# Patient Record
Sex: MALE | Race: BLACK OR AFRICAN AMERICAN | NOT HISPANIC OR LATINO | URBAN - METROPOLITAN AREA
[De-identification: names, ages, dates, MRNs, and addresses within clinical notes are randomized per-mention and may not be internally consistent; named-entity substitution may affect disease eponyms.]

---

## 2019-05-24 ENCOUNTER — INPATIENT (INPATIENT)
Facility: HOSPITAL | Age: 19
LOS: 11 days | Discharge: HOME | End: 2019-06-05
Attending: PSYCHIATRY & NEUROLOGY | Admitting: PSYCHIATRY & NEUROLOGY
Payer: COMMERCIAL

## 2019-05-24 VITALS
HEART RATE: 100 BPM | TEMPERATURE: 99 F | RESPIRATION RATE: 18 BRPM | DIASTOLIC BLOOD PRESSURE: 98 MMHG | SYSTOLIC BLOOD PRESSURE: 150 MMHG | OXYGEN SATURATION: 100 %

## 2019-05-24 DIAGNOSIS — F29 UNSPECIFIED PSYCHOSIS NOT DUE TO A SUBSTANCE OR KNOWN PHYSIOLOGICAL CONDITION: ICD-10-CM

## 2019-05-24 DIAGNOSIS — F20.9 SCHIZOPHRENIA, UNSPECIFIED: ICD-10-CM

## 2019-05-24 DIAGNOSIS — R69 ILLNESS, UNSPECIFIED: ICD-10-CM

## 2019-05-24 LAB
ALBUMIN SERPL ELPH-MCNC: 5.1 G/DL — HIGH (ref 3.3–5)
ALP SERPL-CCNC: 59 U/L — SIGNIFICANT CHANGE UP (ref 40–120)
ALT FLD-CCNC: 38 U/L — SIGNIFICANT CHANGE UP (ref 4–41)
AMPHET UR-MCNC: NEGATIVE — SIGNIFICANT CHANGE UP
ANION GAP SERPL CALC-SCNC: 17 MMO/L — HIGH (ref 7–14)
APAP SERPL-MCNC: < 15 UG/ML — LOW (ref 15–25)
APPEARANCE UR: CLEAR — SIGNIFICANT CHANGE UP
AST SERPL-CCNC: 74 U/L — HIGH (ref 4–40)
BACTERIA # UR AUTO: NEGATIVE — SIGNIFICANT CHANGE UP
BARBITURATES UR SCN-MCNC: NEGATIVE — SIGNIFICANT CHANGE UP
BASOPHILS # BLD AUTO: 0.04 K/UL — SIGNIFICANT CHANGE UP (ref 0–0.2)
BASOPHILS NFR BLD AUTO: 0.6 % — SIGNIFICANT CHANGE UP (ref 0–2)
BENZODIAZ UR-MCNC: NEGATIVE — SIGNIFICANT CHANGE UP
BILIRUB SERPL-MCNC: 1 MG/DL — SIGNIFICANT CHANGE UP (ref 0.2–1.2)
BILIRUB UR-MCNC: NEGATIVE — SIGNIFICANT CHANGE UP
BLOOD UR QL VISUAL: NEGATIVE — SIGNIFICANT CHANGE UP
BUN SERPL-MCNC: 15 MG/DL — SIGNIFICANT CHANGE UP (ref 7–23)
CALCIUM SERPL-MCNC: 9.6 MG/DL — SIGNIFICANT CHANGE UP (ref 8.4–10.5)
CANNABINOIDS UR-MCNC: POSITIVE — SIGNIFICANT CHANGE UP
CHLORIDE SERPL-SCNC: 100 MMOL/L — SIGNIFICANT CHANGE UP (ref 98–107)
CO2 SERPL-SCNC: 21 MMOL/L — LOW (ref 22–31)
COCAINE METAB.OTHER UR-MCNC: NEGATIVE — SIGNIFICANT CHANGE UP
COLOR SPEC: SIGNIFICANT CHANGE UP
CREAT SERPL-MCNC: 1.14 MG/DL — SIGNIFICANT CHANGE UP (ref 0.5–1.3)
EOSINOPHIL # BLD AUTO: 0.03 K/UL — SIGNIFICANT CHANGE UP (ref 0–0.5)
EOSINOPHIL NFR BLD AUTO: 0.4 % — SIGNIFICANT CHANGE UP (ref 0–6)
ETHANOL BLD-MCNC: < 10 MG/DL — SIGNIFICANT CHANGE UP
GLUCOSE SERPL-MCNC: 134 MG/DL — HIGH (ref 70–99)
GLUCOSE UR-MCNC: NEGATIVE — SIGNIFICANT CHANGE UP
HCT VFR BLD CALC: 44 % — SIGNIFICANT CHANGE UP (ref 39–50)
HGB BLD-MCNC: 15 G/DL — SIGNIFICANT CHANGE UP (ref 13–17)
HYALINE CASTS # UR AUTO: SIGNIFICANT CHANGE UP
IMM GRANULOCYTES NFR BLD AUTO: 0.1 % — SIGNIFICANT CHANGE UP (ref 0–1.5)
KETONES UR-MCNC: SIGNIFICANT CHANGE UP
LEUKOCYTE ESTERASE UR-ACNC: NEGATIVE — SIGNIFICANT CHANGE UP
LYMPHOCYTES # BLD AUTO: 2.12 K/UL — SIGNIFICANT CHANGE UP (ref 1–3.3)
LYMPHOCYTES # BLD AUTO: 30.8 % — SIGNIFICANT CHANGE UP (ref 13–44)
MCHC RBC-ENTMCNC: 28.5 PG — SIGNIFICANT CHANGE UP (ref 27–34)
MCHC RBC-ENTMCNC: 34.1 % — SIGNIFICANT CHANGE UP (ref 32–36)
MCV RBC AUTO: 83.7 FL — SIGNIFICANT CHANGE UP (ref 80–100)
METHADONE UR-MCNC: NEGATIVE — SIGNIFICANT CHANGE UP
MONOCYTES # BLD AUTO: 0.67 K/UL — SIGNIFICANT CHANGE UP (ref 0–0.9)
MONOCYTES NFR BLD AUTO: 9.7 % — SIGNIFICANT CHANGE UP (ref 2–14)
NEUTROPHILS # BLD AUTO: 4.02 K/UL — SIGNIFICANT CHANGE UP (ref 1.8–7.4)
NEUTROPHILS NFR BLD AUTO: 58.4 % — SIGNIFICANT CHANGE UP (ref 43–77)
NITRITE UR-MCNC: NEGATIVE — SIGNIFICANT CHANGE UP
NRBC # FLD: 0 K/UL — SIGNIFICANT CHANGE UP (ref 0–0)
OPIATES UR-MCNC: NEGATIVE — SIGNIFICANT CHANGE UP
OXYCODONE UR-MCNC: NEGATIVE — SIGNIFICANT CHANGE UP
PCP UR-MCNC: NEGATIVE — SIGNIFICANT CHANGE UP
PH UR: 6 — SIGNIFICANT CHANGE UP (ref 5–8)
PLATELET # BLD AUTO: 257 K/UL — SIGNIFICANT CHANGE UP (ref 150–400)
PMV BLD: 9.1 FL — SIGNIFICANT CHANGE UP (ref 7–13)
POTASSIUM SERPL-MCNC: 3.1 MMOL/L — LOW (ref 3.5–5.3)
POTASSIUM SERPL-SCNC: 3.1 MMOL/L — LOW (ref 3.5–5.3)
PROT SERPL-MCNC: 7.6 G/DL — SIGNIFICANT CHANGE UP (ref 6–8.3)
PROT UR-MCNC: 70 — SIGNIFICANT CHANGE UP
RBC # BLD: 5.26 M/UL — SIGNIFICANT CHANGE UP (ref 4.2–5.8)
RBC # FLD: 11.2 % — SIGNIFICANT CHANGE UP (ref 10.3–14.5)
RBC CASTS # UR COMP ASSIST: SIGNIFICANT CHANGE UP (ref 0–?)
SALICYLATES SERPL-MCNC: < 5 MG/DL — LOW (ref 15–30)
SODIUM SERPL-SCNC: 138 MMOL/L — SIGNIFICANT CHANGE UP (ref 135–145)
SP GR SPEC: > 1.04 — HIGH (ref 1–1.04)
SQUAMOUS # UR AUTO: SIGNIFICANT CHANGE UP
TSH SERPL-MCNC: 1.75 UIU/ML — SIGNIFICANT CHANGE UP (ref 0.27–4.2)
UROBILINOGEN FLD QL: SIGNIFICANT CHANGE UP
WBC # BLD: 6.89 K/UL — SIGNIFICANT CHANGE UP (ref 3.8–10.5)
WBC # FLD AUTO: 6.89 K/UL — SIGNIFICANT CHANGE UP (ref 3.8–10.5)
WBC UR QL: SIGNIFICANT CHANGE UP (ref 0–?)

## 2019-05-24 PROCEDURE — 99285 EMERGENCY DEPT VISIT HI MDM: CPT

## 2019-05-24 RX ORDER — HALOPERIDOL DECANOATE 100 MG/ML
5 INJECTION INTRAMUSCULAR ONCE
Refills: 0 | Status: COMPLETED | OUTPATIENT
Start: 2019-05-24 | End: 2019-05-24

## 2019-05-24 RX ORDER — DIPHENHYDRAMINE HCL 50 MG
50 CAPSULE ORAL ONCE
Refills: 0 | Status: COMPLETED | OUTPATIENT
Start: 2019-05-24 | End: 2019-05-24

## 2019-05-24 RX ORDER — HALOPERIDOL DECANOATE 100 MG/ML
5 INJECTION INTRAMUSCULAR ONCE
Refills: 0 | Status: DISCONTINUED | OUTPATIENT
Start: 2019-05-25 | End: 2019-06-05

## 2019-05-24 RX ORDER — POTASSIUM CHLORIDE 20 MEQ
40 PACKET (EA) ORAL ONCE
Refills: 0 | Status: COMPLETED | OUTPATIENT
Start: 2019-05-24 | End: 2019-05-24

## 2019-05-24 RX ORDER — HALOPERIDOL DECANOATE 100 MG/ML
5 INJECTION INTRAMUSCULAR EVERY 6 HOURS
Refills: 0 | Status: DISCONTINUED | OUTPATIENT
Start: 2019-05-25 | End: 2019-06-05

## 2019-05-24 RX ADMIN — Medication 40 MILLIEQUIVALENT(S): at 17:04

## 2019-05-24 RX ADMIN — Medication 50 MILLIGRAM(S): at 12:10

## 2019-05-24 RX ADMIN — HALOPERIDOL DECANOATE 5 MILLIGRAM(S): 100 INJECTION INTRAMUSCULAR at 20:00

## 2019-05-24 RX ADMIN — HALOPERIDOL DECANOATE 5 MILLIGRAM(S): 100 INJECTION INTRAMUSCULAR at 12:10

## 2019-05-24 RX ADMIN — Medication 2 MILLIGRAM(S): at 20:00

## 2019-05-24 RX ADMIN — Medication 50 MILLIGRAM(S): at 19:59

## 2019-05-24 RX ADMIN — Medication 2 MILLIGRAM(S): at 12:10

## 2019-05-24 NOTE — ED ADULT TRIAGE NOTE - CHIEF COMPLAINT QUOTE
Patient brought in with mother. Patient's mother reports that patient has been having difficulty sleeping and defiant behavior. Patient currently is A&ox4, denies any SI/HI/auditory or visual hallucinations. 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

## 2019-05-24 NOTE — ED ADULT NURSE REASSESSMENT NOTE - DESCRIPTION
1210-Pt very anxious and agitated on presentation, very suspicious, unable to follow command. Pt refusing to change into hospital gowns and give up belongings. Therapeutic communication employed with no effect. Pt became combative when staff attempted to assist pt with changing into gowns and tried to elope from the unit. Pt restrained and medicated for safety and stabilization. 1210-Pt very anxious and agitated on presentation, very suspicious, unable to follow command. Pt refusing to change into hospital gowns and give up belongings. Therapeutic communication employed with no effect. Pt became combative when staff attempted to assist pt with changing into gowns and tried to elope from the unit. Pt restrained and medicated for safety and stabilization. 1:1 observation initiated.

## 2019-05-24 NOTE — ED BEHAVIORAL HEALTH ASSESSMENT NOTE - DIFFERENTIAL
unspecified psychosis  schizoaffective disorder  bipolar I disorder with psychosis  schizophrenia  substance-induced mood/psychotic disorder

## 2019-05-24 NOTE — ED BEHAVIORAL HEALTH ASSESSMENT NOTE - HPI (INCLUDE ILLNESS QUALITY, SEVERITY, DURATION, TIMING, CONTEXT, MODIFYING FACTORS, ASSOCIATED SIGNS AND SYMPTOMS)
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    On arrival to  ED, pt was very agitated and tried to elope from unit. He required Haldol 5 mg, Ativan 2 mg, Benadryl 50 mg IM x 1 for safety of pt and others.   Patient sedated for several hours. He was interviewed when awake and alert. On assessment, pt is superficially cooperative, guarded, and exhibits grossly disorganized thought process. He appears suspicious at times. Pt provides disorganized response when asked why his mother brought him to the ED. He says he was filling out paperwork for his [employer] job and then his mother came home and they cooked together. When asked again, pt says his mother jokingly told him that people are watching him. When asked about events leading up to him receiving IM medications earlier, pt says "I was anxious" but is unable/unwilling to elaborate. He says "I changed out of my clothes too slowly." Pt perseverates on wanting to be discharged so that he can go to school and work. 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 He denies being famous but states he has made a bit of a name for himself. Pt admits to not sleeping recently, stating "I can't get things done if I oversleep." He denies increased energy, racing thoughts. No overt paranoid ideation/delusions elicited. He denies AH/VH. He denies SI/HI. He denies depression. He denies having a psychiatric condition. Pt reports using marijuana and alcohol "once a month." He denies recent use. He denies other substance use.  See  note for collateral obtained from pt's mother.

## 2019-05-24 NOTE — ED BEHAVIORAL HEALTH ASSESSMENT NOTE - SUMMARY
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  Patient presents very agitated, appears suspicious, with disorganized thought process. Pt is unable to care for self at this time and requires inpatient psychiatric admission for safety and stabilization.

## 2019-05-24 NOTE — ED BEHAVIORAL HEALTH ASSESSMENT NOTE - SUICIDE RISK FACTORS
Highly impulsive behavior/Substance abuse/dependence/Agitation/severe anxiety/Global insomnia/Mood episode/Access to means (pills, firearms, etc.)

## 2019-05-24 NOTE — ED BEHAVIORAL HEALTH NOTE - BEHAVIORAL HEALTH NOTE
SW spoke with mother Amairani [phone] to get collateral in family room. Mother stated she brought patient to ER due to multiple behaviors and patient has not slept in a week. Patient has been studying for an  class all week and the test was yesterday.  Patient has been pacing, not able to focus/have conversation, irrational, paranoid and hallucinating. Patient not being able to have conversation and mother will have to repeat multiple times, she believes due to no sleep. For the past few months he has odd sleeping patterns and does not sleep. Patient irrational and paranoid with others taking his things. 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 Patient has hallucinations of flashing lights then periods of darkness that he sees and tells mother. No other safety concerns such as verbal or physical violence. Patient uses marijuana daily and believes today he had K2 mixed in with marijuana. Believes he also consumes alcohol, unknown last use.  Patient is not currently on medication and has not been on anything for 3-4yrs. Mother does not remember medication name or psychiatrist that was prescribing. She stopped medication due to marijuana use and nervous about side effects. No legal concerns. Last treatment for [facility] hospital was 5 years ago at [facility]. Patient has not been in therapy for 3yrs last time with Dr. Carey Knowles [phone] in [place]. Mother made a new appointment to revisit therapy for the 6/1/19 but instead brought him to ER due to increase behavior. No medical problems or access to weapons noted. Mother stated he eats only junk food, has to serve food in front of him to eat and has extremely good hygiene. Mother expressed when in this state he uses two bars of soap, and alcohol wipes all over his body in between showers. Patient is in school at [employer] in [place] for [employer] design and .  He also wants to be a rapper and produces music with friends. He recently got a new job at the [employer] with [employer] supposed to start on Thursday. No hx of violence or SI. Mother is very involved, patient lives with father, mother and two other siblings. Very close with Uncle Lasha who came at the end of conversation. He is sees the same behaviors and patient practices martial arts. SW will continue to monitor. Team made aware.

## 2019-05-24 NOTE — ED PROVIDER NOTE - CLINICAL SUMMARY MEDICAL DECISION MAKING FREE TEXT BOX
19 y/o M  hx  Depression, ADHD  Labs, Urine Tox/UA, EKG.  Medical evaluation performed. There is no clinical evidence of intoxication or any acute medical problem requiring immediate intervention. Patient is awaiting psychiatric consultation. Final disposition will be determined by psychiatrist.

## 2019-05-24 NOTE — ED PROVIDER NOTE - OBJECTIVE STATEMENT
17 y/o M  hx  Depression, ADHD  BIBA   w c/o increase agitation and bizarre behaviour.  Patient appears paranoid.  Denies falling, punching or kicking any objects. Denies pain, SOB, fever, chills, chest/ abdominal discomfort. Denies SI/HI/VH. Denies recent use of alcohol . No evidence  of physical injuries, broken  skin or deformities. Admits to medication non compliance.

## 2019-05-24 NOTE — ED ADULT NURSE REASSESSMENT NOTE - NS ED NURSE REASSESS COMMENT FT1
1227- Restraints discontinued, no signs of injury to pt noted or reported. Pt sedated, but easily arousable. Respirations even and unlabored. Safety and comfort measures maintained. will continue to monitor.

## 2019-05-24 NOTE — ED BEHAVIORAL HEALTH ASSESSMENT NOTE - OTHER
CVM superficially cooperative, guarded a bit irritable paranoia per mother; pt appears suspicious per mother mother [phone]

## 2019-05-24 NOTE — ED BEHAVIORAL HEALTH ASSESSMENT NOTE - DESCRIPTION
see HPI    Vital Signs Last 24 Hrs  T(C): 37 (24 May 2019 12:28), Max: 37 (24 May 2019 12:28)  T(F): 98.6 (24 May 2019 12:28), Max: 98.6 (24 May 2019 12:28)  HR: 100 (24 May 2019 14:09) (100 - 100)  BP: 145/79 (24 May 2019 14:09) (145/79 - 150/98)  BP(mean): --  RR: 18 (24 May 2019 14:09) (18 - 18)  SpO2: 100% (24 May 2019 14:09) (100% - 100%) none see HPI

## 2019-05-25 RX ORDER — RISPERIDONE 4 MG/1
1 TABLET ORAL AT BEDTIME
Refills: 0 | Status: DISCONTINUED | OUTPATIENT
Start: 2019-05-25 | End: 2019-05-31

## 2019-05-25 RX ADMIN — RISPERIDONE 1 MILLIGRAM(S): 4 TABLET ORAL at 22:01

## 2019-05-26 PROCEDURE — 99232 SBSQ HOSP IP/OBS MODERATE 35: CPT

## 2019-05-26 RX ADMIN — RISPERIDONE 1 MILLIGRAM(S): 4 TABLET ORAL at 21:29

## 2019-05-27 PROCEDURE — 99232 SBSQ HOSP IP/OBS MODERATE 35: CPT

## 2019-05-28 RX ORDER — LANOLIN ALCOHOL/MO/W.PET/CERES
3 CREAM (GRAM) TOPICAL ONCE
Refills: 0 | Status: COMPLETED | OUTPATIENT
Start: 2019-05-28 | End: 2019-05-28

## 2019-05-28 RX ADMIN — Medication 3 MILLIGRAM(S): at 02:00

## 2019-05-29 RX ADMIN — RISPERIDONE 1 MILLIGRAM(S): 4 TABLET ORAL at 20:56

## 2019-05-30 RX ADMIN — RISPERIDONE 1 MILLIGRAM(S): 4 TABLET ORAL at 21:48

## 2019-05-31 PROCEDURE — 90853 GROUP PSYCHOTHERAPY: CPT

## 2019-05-31 RX ORDER — RISPERIDONE 4 MG/1
2 TABLET ORAL AT BEDTIME
Refills: 0 | Status: DISCONTINUED | OUTPATIENT
Start: 2019-05-31 | End: 2019-06-05

## 2019-05-31 RX ADMIN — RISPERIDONE 2 MILLIGRAM(S): 4 TABLET ORAL at 22:01

## 2019-06-01 RX ADMIN — RISPERIDONE 2 MILLIGRAM(S): 4 TABLET ORAL at 22:03

## 2019-06-02 RX ADMIN — RISPERIDONE 2 MILLIGRAM(S): 4 TABLET ORAL at 22:07

## 2019-06-03 RX ADMIN — RISPERIDONE 2 MILLIGRAM(S): 4 TABLET ORAL at 23:00

## 2019-06-04 RX ORDER — RISPERIDONE 4 MG/1
1 TABLET ORAL
Qty: 30 | Refills: 0
Start: 2019-06-04 | End: 2019-07-03

## 2019-06-04 RX ADMIN — RISPERIDONE 2 MILLIGRAM(S): 4 TABLET ORAL at 21:22

## 2019-06-05 VITALS — HEART RATE: 60 BPM | SYSTOLIC BLOOD PRESSURE: 121 MMHG | TEMPERATURE: 98 F | DIASTOLIC BLOOD PRESSURE: 60 MMHG

## 2019-06-05 LAB
CHOLEST SERPL-MCNC: 125 MG/DL — SIGNIFICANT CHANGE UP (ref 120–199)
HAV IGM SER-ACNC: NONREACTIVE — SIGNIFICANT CHANGE UP
HBA1C BLD-MCNC: 4.6 % — SIGNIFICANT CHANGE UP (ref 4–5.6)
HBV CORE IGM SER-ACNC: NONREACTIVE — SIGNIFICANT CHANGE UP
HBV SURFACE AG SER-ACNC: NONREACTIVE — SIGNIFICANT CHANGE UP
HCV AB S/CO SERPL IA: 0.05 S/CO — SIGNIFICANT CHANGE UP (ref 0–0.99)
HCV AB SERPL-IMP: SIGNIFICANT CHANGE UP
HDLC SERPL-MCNC: 42 MG/DL — SIGNIFICANT CHANGE UP (ref 35–55)
HIV 1+2 AB+HIV1 P24 AG SERPL QL IA: SIGNIFICANT CHANGE UP
LIPID PNL WITH DIRECT LDL SERPL: 80 MG/DL — SIGNIFICANT CHANGE UP
TRIGL SERPL-MCNC: 76 MG/DL — SIGNIFICANT CHANGE UP (ref 10–149)

## 2019-06-05 PROCEDURE — 93010 ELECTROCARDIOGRAM REPORT: CPT

## 2019-06-06 ENCOUNTER — OUTPATIENT (OUTPATIENT)
Dept: OUTPATIENT SERVICES | Facility: HOSPITAL | Age: 19
LOS: 1 days | Discharge: HOME | End: 2019-06-06

## 2019-06-06 LAB
C TRACH RRNA SPEC QL NAA+PROBE: SIGNIFICANT CHANGE UP
N GONORRHOEA RRNA SPEC QL NAA+PROBE: SIGNIFICANT CHANGE UP
SPECIMEN SOURCE: SIGNIFICANT CHANGE UP
T PALLIDUM AB TITR SER: NEGATIVE — SIGNIFICANT CHANGE UP

## 2019-06-07 DIAGNOSIS — F12.20 CANNABIS DEPENDENCE, UNCOMPLICATED: ICD-10-CM

## 2019-06-07 DIAGNOSIS — F30.2 MANIC EPISODE, SEVERE WITH PSYCHOTIC SYMPTOMS: ICD-10-CM

## 2019-10-19 ENCOUNTER — INPATIENT (INPATIENT)
Facility: HOSPITAL | Age: 19
LOS: 16 days | Discharge: HOME | End: 2019-11-05
Attending: PSYCHIATRY & NEUROLOGY | Admitting: PSYCHIATRY & NEUROLOGY
Payer: COMMERCIAL

## 2019-10-19 VITALS
DIASTOLIC BLOOD PRESSURE: 84 MMHG | RESPIRATION RATE: 18 BRPM | OXYGEN SATURATION: 99 % | SYSTOLIC BLOOD PRESSURE: 156 MMHG | TEMPERATURE: 98 F | HEART RATE: 107 BPM

## 2019-10-19 DIAGNOSIS — F31.2 BIPOLAR DISORDER, CURRENT EPISODE MANIC SEVERE WITH PSYCHOTIC FEATURES: ICD-10-CM

## 2019-10-19 DIAGNOSIS — F12.10 CANNABIS ABUSE, UNCOMPLICATED: ICD-10-CM

## 2019-10-19 DIAGNOSIS — F20.9 SCHIZOPHRENIA, UNSPECIFIED: ICD-10-CM

## 2019-10-19 LAB
ALBUMIN SERPL ELPH-MCNC: 5.3 G/DL — HIGH (ref 3.3–5)
ALP SERPL-CCNC: 63 U/L — SIGNIFICANT CHANGE UP (ref 40–120)
ALT FLD-CCNC: 22 U/L — SIGNIFICANT CHANGE UP (ref 4–41)
ANION GAP SERPL CALC-SCNC: 15 MMO/L — HIGH (ref 7–14)
APAP SERPL-MCNC: < 15 UG/ML — LOW (ref 15–25)
AST SERPL-CCNC: 42 U/L — HIGH (ref 4–40)
BASOPHILS # BLD AUTO: 0.06 K/UL — SIGNIFICANT CHANGE UP (ref 0–0.2)
BASOPHILS NFR BLD AUTO: 0.9 % — SIGNIFICANT CHANGE UP (ref 0–2)
BILIRUB SERPL-MCNC: 0.9 MG/DL — SIGNIFICANT CHANGE UP (ref 0.2–1.2)
BUN SERPL-MCNC: 13 MG/DL — SIGNIFICANT CHANGE UP (ref 7–23)
CALCIUM SERPL-MCNC: 10.2 MG/DL — SIGNIFICANT CHANGE UP (ref 8.4–10.5)
CHLORIDE SERPL-SCNC: 107 MMOL/L — SIGNIFICANT CHANGE UP (ref 98–107)
CO2 SERPL-SCNC: 24 MMOL/L — SIGNIFICANT CHANGE UP (ref 22–31)
CREAT SERPL-MCNC: 0.99 MG/DL — SIGNIFICANT CHANGE UP (ref 0.5–1.3)
EOSINOPHIL # BLD AUTO: 0.17 K/UL — SIGNIFICANT CHANGE UP (ref 0–0.5)
EOSINOPHIL NFR BLD AUTO: 2.7 % — SIGNIFICANT CHANGE UP (ref 0–6)
ETHANOL BLD-MCNC: < 10 MG/DL — SIGNIFICANT CHANGE UP
GLUCOSE SERPL-MCNC: 99 MG/DL — SIGNIFICANT CHANGE UP (ref 70–99)
HCT VFR BLD CALC: 49.5 % — SIGNIFICANT CHANGE UP (ref 39–50)
HGB BLD-MCNC: 15.5 G/DL — SIGNIFICANT CHANGE UP (ref 13–17)
IMM GRANULOCYTES NFR BLD AUTO: 0.3 % — SIGNIFICANT CHANGE UP (ref 0–1.5)
LYMPHOCYTES # BLD AUTO: 2.18 K/UL — SIGNIFICANT CHANGE UP (ref 1–3.3)
LYMPHOCYTES # BLD AUTO: 34.1 % — SIGNIFICANT CHANGE UP (ref 13–44)
MCHC RBC-ENTMCNC: 27.9 PG — SIGNIFICANT CHANGE UP (ref 27–34)
MCHC RBC-ENTMCNC: 31.3 % — LOW (ref 32–36)
MCV RBC AUTO: 89 FL — SIGNIFICANT CHANGE UP (ref 80–100)
MONOCYTES # BLD AUTO: 0.46 K/UL — SIGNIFICANT CHANGE UP (ref 0–0.9)
MONOCYTES NFR BLD AUTO: 7.2 % — SIGNIFICANT CHANGE UP (ref 2–14)
NEUTROPHILS # BLD AUTO: 3.51 K/UL — SIGNIFICANT CHANGE UP (ref 1.8–7.4)
NEUTROPHILS NFR BLD AUTO: 54.8 % — SIGNIFICANT CHANGE UP (ref 43–77)
NRBC # FLD: 0 K/UL — SIGNIFICANT CHANGE UP (ref 0–0)
PLATELET # BLD AUTO: 293 K/UL — SIGNIFICANT CHANGE UP (ref 150–400)
PMV BLD: 9.4 FL — SIGNIFICANT CHANGE UP (ref 7–13)
POTASSIUM SERPL-MCNC: 3.4 MMOL/L — LOW (ref 3.5–5.3)
POTASSIUM SERPL-SCNC: 3.4 MMOL/L — LOW (ref 3.5–5.3)
PROT SERPL-MCNC: 7.8 G/DL — SIGNIFICANT CHANGE UP (ref 6–8.3)
RBC # BLD: 5.56 M/UL — SIGNIFICANT CHANGE UP (ref 4.2–5.8)
RBC # FLD: 11.6 % — SIGNIFICANT CHANGE UP (ref 10.3–14.5)
SALICYLATES SERPL-MCNC: < 5 MG/DL — LOW (ref 15–30)
SODIUM SERPL-SCNC: 146 MMOL/L — HIGH (ref 135–145)
TSH SERPL-MCNC: 1.53 UIU/ML — SIGNIFICANT CHANGE UP (ref 0.27–4.2)
WBC # BLD: 6.4 K/UL — SIGNIFICANT CHANGE UP (ref 3.8–10.5)
WBC # FLD AUTO: 6.4 K/UL — SIGNIFICANT CHANGE UP (ref 3.8–10.5)

## 2019-10-19 PROCEDURE — 99285 EMERGENCY DEPT VISIT HI MDM: CPT

## 2019-10-19 RX ORDER — HALOPERIDOL DECANOATE 100 MG/ML
5 INJECTION INTRAMUSCULAR ONCE
Refills: 0 | Status: COMPLETED | OUTPATIENT
Start: 2019-10-19 | End: 2019-10-19

## 2019-10-19 RX ORDER — DIPHENHYDRAMINE HCL 50 MG
50 CAPSULE ORAL ONCE
Refills: 0 | Status: COMPLETED | OUTPATIENT
Start: 2019-10-19 | End: 2019-10-19

## 2019-10-19 RX ORDER — POTASSIUM CHLORIDE 20 MEQ
40 PACKET (EA) ORAL ONCE
Refills: 0 | Status: COMPLETED | OUTPATIENT
Start: 2019-10-19 | End: 2019-10-19

## 2019-10-19 RX ORDER — HALOPERIDOL DECANOATE 100 MG/ML
5 INJECTION INTRAMUSCULAR ONCE
Refills: 0 | Status: DISCONTINUED | OUTPATIENT
Start: 2019-10-19 | End: 2019-11-05

## 2019-10-19 RX ORDER — RISPERIDONE 4 MG/1
1 TABLET ORAL
Refills: 0 | Status: DISCONTINUED | OUTPATIENT
Start: 2019-10-19 | End: 2019-10-22

## 2019-10-19 RX ORDER — HALOPERIDOL DECANOATE 100 MG/ML
5 INJECTION INTRAMUSCULAR EVERY 6 HOURS
Refills: 0 | Status: DISCONTINUED | OUTPATIENT
Start: 2019-10-19 | End: 2019-11-05

## 2019-10-19 RX ADMIN — HALOPERIDOL DECANOATE 5 MILLIGRAM(S): 100 INJECTION INTRAMUSCULAR at 10:20

## 2019-10-19 RX ADMIN — Medication 2 MILLIGRAM(S): at 10:20

## 2019-10-19 RX ADMIN — Medication 50 MILLIGRAM(S): at 10:20

## 2019-10-19 RX ADMIN — Medication 2 MILLIGRAM(S): at 14:12

## 2019-10-19 RX ADMIN — HALOPERIDOL DECANOATE 5 MILLIGRAM(S): 100 INJECTION INTRAMUSCULAR at 14:12

## 2019-10-19 NOTE — ED BEHAVIORAL HEALTH ASSESSMENT NOTE - HPI (INCLUDE ILLNESS QUALITY, SEVERITY, DURATION, TIMING, CONTEXT, MODIFYING FACTORS, ASSOCIATED SIGNS AND SYMPTOMS)
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    On arrival to  ED, pt was very agitated and tried to elope from unit. He required Haldol 5 mg, Ativan 2 mg, Benadryl 50 mg IM x 1 for safety of pt and others.   Patient sedated for several hours. He was interviewed when awake and alert. On assessment, pt is superficially cooperative, guarded, and exhibits grossly disorganized thought process. He appears suspicious at times. Pt provides disorganized response when asked why his mother brought him to the ED. He says he was filling out paperwork for his [employer] job and then his mother came home and they cooked together. When asked again, pt says his mother jokingly told him that people are watching him. When asked about events leading up to him receiving IM medications earlier, pt says "I was anxious" but is unable/unwilling to elaborate. He says "I changed out of my clothes too slowly." Pt perseverates on wanting to be discharged so that he can go to school and work. 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 He denies being famous but states he has made a bit of a name for himself. Pt admits to not sleeping recently, stating "I can't get things done if I oversleep." He denies increased energy, racing thoughts. No overt paranoid ideation/delusions elicited. He denies AH/VH. He denies SI/HI. He denies depression. He denies having a psychiatric condition. Pt reports using marijuana and alcohol "once a month." He denies recent use. He denies other substance use.  See  note for collateral obtained from pt's mother. 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 Bundy on 10/15/19 was treated with Risperdal, noncompliant with psychiatric treatment with mother recently dispensing meds since 10/15, no history of self-injurious behavior or suicide attempts, no h/o violence or legal issues, no PMH, +regular cannabis use, brought in by family (mother, father, uncle) for not sleeping, paranoia, grandiosity, and disorganized thought process in context of medication noncompliance and likely ongoing cannabis use.    On arrival to  ED, pt was very agitated and was not responding to verbal redirection. He required Haldol 5 mg, Ativan 2 mg, Benadryl 50 mg IM x 1 for safety of pt and others.   Patient was interviewed and observed on both triage and after being medicated.  On assessment, pt is superficially cooperative, guarded, and exhibits grossly disorganized thought process. He appears suspicious at times. Pt provides disorganized response when asked why his mother brought him to the ED.  Patient notably makes statements that he's the "champion" and starts shadow boxing, unable to rationally participate in interview.  Patient is noted to be hyper and seen talking to himself prior to being medicated, acutely labile.  When asked about events leading up to him receiving IM medications earlier, pt says "I was anxious" but is unable/unwilling to elaborate.  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  Patient has not been taking his meds and is visibly paranoid, suspicious on exam with noted persecutory delusions and grandiosity.  Patient had reportedly tried to put his mother's house up for sale for 3 million dollars, despite this being a house owned by mother and not worth that amount.  Patient has not been sleeping and been not taking meds regularly, notably was exhibiting ideas of reference, delusions and missionary ideas.  Patient is acutely agitated on presentation and is exhibiting racing thoughts, flight of ideas not able to linearly participate in interview.  He denies AH/VH but appears to be internally preoccupied.  He denies SI/HI.  He denies depression.  He denies having a psychiatric condition.  Pt is reported to have been using marijuana but unsure of frequency, mother reports known use on 10/15.  Patient is acutely manic, labile with agitation and unable to maintain behavioral control.    Collateral obtained from pt's mother Amairani, father Addison Mohr and uncle, jatinder who report patient has been disruptive at school yesterday, hyper and seen to be talking to himself.  Patient has been irrational and expressed concern for his family's safety but unable to explain why.  Deny SI or HI but express patient has not been sleeping and advocate for admissoin. 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 Bundy on 10/15/19 was treated with Risperdal, noncompliant with psychiatric treatment with mother recently dispensing meds since 10/15, no history of self-injurious behavior or suicide attempts, no h/o violence or legal issues, no PMH, +regular cannabis use, brought in by family (mother, father, uncle) for not sleeping, paranoia, grandiosity, and disorganized thought process in context of medication noncompliance and likely ongoing cannabis use.    On arrival to  ED, pt was very agitated and was not responding to verbal redirection. He required Haldol 5 mg, Ativan 2 mg, Benadryl 50 mg IM x 1 for safety of pt and others.   Patient was interviewed and observed on both triage and after being medicated.  On assessment, pt is superficially cooperative, guarded, and exhibits grossly disorganized thought process. He appears suspicious at times. Pt provides disorganized response when asked why his mother brought him to the ED.  Patient notably makes statements that he's the "champion" and starts shadow boxing, unable to rationally participate in interview.  Patient is noted to be hyper and seen talking to himself prior to being medicated, acutely labile.  When asked about events leading up to him receiving IM medications earlier, pt says "I was anxious" but is unable/unwilling to elaborate.  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  Patient has not been taking his meds and is visibly paranoid, suspicious on exam with noted persecutory delusions and grandiosity.  Patient had reportedly tried to put his mother's house up for sale for 3 million dollars, despite this being a house owned by mother and not worth that amount.  Patient has not been sleeping and been not taking meds regularly, notably was exhibiting ideas of reference, delusions and missionary ideas.  Patient is acutely agitated on presentation and is exhibiting racing thoughts, flight of ideas not able to linearly participate in interview.  He denies AH/VH but appears to be internally preoccupied.  He denies SI/HI.  He denies depression.  He denies having a psychiatric condition.  Pt is reported to have been using marijuana but unsure of frequency, mother reports known use on 10/15.  Patient is acutely manic, labile with agitation and unable to maintain behavioral control.    Collateral obtained from pt's mother Amairani, father Addison Mohr and uncle, jatinder who report patient has been disruptive at school yesterday, hyper and seen to be talking to himself.  Patient has been irrational and expressed concern for his family's safety but unable to explain why.  Deny SI or HI but express patient has not been sleeping and advocate for admissoin.

## 2019-10-19 NOTE — ED BEHAVIORAL HEALTH NOTE - BEHAVIORAL HEALTH NOTE
CORNELIUS called [facility] [phone] to obtain pre authorization. CORNELIUS first spoke with Thelma LEVY who took information. CORNELIUS then spoke with Johanna WADDELL to obtain authorization. Patient was authorized for 3 days 10/19 - 1021/ Review to take place 10/21/2019. Reviewer CYN. Auth # [id number]

## 2019-10-19 NOTE — ED ADULT NURSE NOTE - CHIEF COMPLAINT QUOTE
Arrives with parents for psych evaluation 2/2 mumbling, and not making sense.  Pt noted to have mild paranoia.  As per mother pt compliant with medications, this week.  Received 2 phone calls from school for acting out, talking, mumbling and not listening.  Pt hearing voices.  the voices say "I'm immortal".  Pt noted to have flight of ideas and mumbling in triage.  Pt denies any drug use as per mother. pt's friend endorsed that they smoked K2 last week.

## 2019-10-19 NOTE — ED BEHAVIORAL HEALTH ASSESSMENT NOTE - NAME OF SCHOOL
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

## 2019-10-19 NOTE — ED BEHAVIORAL HEALTH ASSESSMENT NOTE - SUICIDE PROTECTIVE FACTORS
Supportive social network of family or friends/Has future plans/Engaged in work or school/Identifies reasons for living

## 2019-10-19 NOTE — ED ADULT NURSE REASSESSMENT NOTE - NS ED NURSE REASSESS COMMENT FT1
Pt lying on bed eyes close breathing even & unlabored evaluated and cleared by Psych / ER attending for admission report giving to [facility] RN, EMS activated enroute safety & comfort measures maintained eval on going.

## 2019-10-19 NOTE — ED BEHAVIORAL HEALTH ASSESSMENT NOTE - OTHER
[id number] CVM superficially cooperative, guarded mother a bit irritable paranoia per mother; pt appears suspicious per mother seen talking to self, appears internally preoccupied guarded

## 2019-10-19 NOTE — ED PROVIDER NOTE - CLINICAL SUMMARY MEDICAL DECISION MAKING FREE TEXT BOX
19 yo male with psychiatric decompensation. will obtain medical clearance labs, EKG, give IM medications for agitation to enable appropriate workup and consult psych.

## 2019-10-19 NOTE — ED BEHAVIORAL HEALTH ASSESSMENT NOTE - INVOLUNTARY INTRAMUSCULAR MEDICATION DETAILS
see HPI see HPI/ ED course above - Haldol 5mg/ Ativan 2mg / Benadryl 50mg at 10:08am and then Haldol 5mg/ Ativan 2mg at 14:12 for 2 bouts of agitation with benefit.

## 2019-10-19 NOTE — ED BEHAVIORAL HEALTH ASSESSMENT NOTE - SUICIDE RISK FACTORS
Current mood episode/Access to lethal methods (pills, firearm, etc.: Ask specifically about presence or absence of a firearm in the home or ease of accessing/Alcohol/Substance abuse disorders/Agitation/Severe Anxiety/Panic/Insomnia Alcohol/Substance abuse disorders/Access to lethal methods (pills, firearm, etc.: Ask specifically about presence or absence of a firearm in the home or ease of accessing/Mood Disorder current/past/Current mood episode/Agitation/Severe Anxiety/Panic/Insomnia

## 2019-10-19 NOTE — ED BEHAVIORAL HEALTH ASSESSMENT NOTE - PSYCHIATRIC ISSUES AND PLAN (INCLUDE STANDING AND PRN MEDICATION)
Start on Risperdal 1mg BID; use Haldol 5 mg, Ativan 2 mg PO/IM prn agitation Start on Risperdal 1mg BID for treatment of psychosis; use Haldol 5 mg, Ativan 2 mg PO/IM prn agitation

## 2019-10-19 NOTE — ED BEHAVIORAL HEALTH ASSESSMENT NOTE - OTHER PAST PSYCHIATRIC HISTORY (INCLUDE DETAILS REGARDING ONSET, COURSE OF ILLNESS, INPATIENT/OUTPATIENT TREATMENT)
see HPI charted diagnoses of Bipolar Disorder with psychosis, history of cannabis abuse, 2 past psych hospitalization, first in 2014 at [facility] for psychosis and most recently at [facility] from 5/24-6/5/19, attends [facility] and most recently has seen psychiatrist, Dr. Bundy on 10/15/19

## 2019-10-19 NOTE — ED BEHAVIORAL HEALTH ASSESSMENT NOTE - DESCRIPTION
see HPI    Vital Signs Last 24 Hrs  T(C): 36.7 (19 Oct 2019 09:49), Max: 36.7 (19 Oct 2019 09:49)  T(F): 98.1 (19 Oct 2019 09:49), Max: 98.1 (19 Oct 2019 09:49)  HR: 107 (19 Oct 2019 09:49) (107 - 107)  BP: 156/84 (19 Oct 2019 09:49) (156/84 - 156/84)  BP(mean): --  RR: 18 (19 Oct 2019 09:49) (18 - 18)  SpO2: 99% (19 Oct 2019 09:49) (99% - 99%) none see HPI irritable, agitated with emergent IM medications required for safety    Vital Signs Last 24 Hrs  T(C): 36.7 (19 Oct 2019 09:49), Max: 36.7 (19 Oct 2019 09:49)  T(F): 98.1 (19 Oct 2019 09:49), Max: 98.1 (19 Oct 2019 09:49)  HR: 107 (19 Oct 2019 09:49) (107 - 107)  BP: 156/84 (19 Oct 2019 09:49) (156/84 - 156/84)  BP(mean): --  RR: 18 (19 Oct 2019 09:49) (18 - 18)  SpO2: 99% (19 Oct 2019 09:49) (99% - 99%) irritable, agitated with emergent IM medications required for safety and on triage required Haldol 5mg/Ativan 2mg IM at 10:08am followed by Benadryl 50mg IM at 10:20 with benefit.  At time before transfer to [facility] patient reportedly attempted to elope and required IM of Haldol 5mg/ Ativan 2mg at 14:12 with benefit and was transferred safely.    Vital Signs Last 24 Hrs  T(C): 36.7 (19 Oct 2019 09:49), Max: 36.7 (19 Oct 2019 09:49)  T(F): 98.1 (19 Oct 2019 09:49), Max: 98.1 (19 Oct 2019 09:49)  HR: 107 (19 Oct 2019 09:49) (107 - 107)  BP: 156/84 (19 Oct 2019 09:49) (156/84 - 156/84)  BP(mean): --  RR: 18 (19 Oct 2019 09:49) (18 - 18)  SpO2: 99% (19 Oct 2019 09:49) (99% - 99%)

## 2019-10-19 NOTE — ED ADULT NURSE NOTE - NSIMPLEMENTINTERV_GEN_ALL_ED
Implemented All Universal Safety Interventions:  [place] to call system. Call bell, personal items and telephone within reach. Instruct patient to call for assistance. Room bathroom lighting operational. Non-slip footwear when patient is off stretcher. Physically safe environment: no spills, clutter or unnecessary equipment. Stretcher in lowest position, wheels locked, appropriate side rails in place.

## 2019-10-19 NOTE — ED ADULT TRIAGE NOTE - CHIEF COMPLAINT QUOTE
Arrives with parents for psych evaluation 2/2 mumbling, and not making sense.  Pt noted to have mild paranoia.  As per mother pt compliant with medications.  Received 2 phone calls from school for acting out, talking, mumbling and not listening.  Pt hearing voices.  the voices say "I'm immortal".  Pt noted to have flight of ideas and mumbling in triage.  Pt denies any drug use as per mother. pt's friend endorsed that they smoked K2 last week. Arrives with parents for psych evaluation 2/2 mumbling, and not making sense.  Pt noted to have mild paranoia.  As per mother pt compliant with medications, this week.  Received 2 phone calls from school for acting out, talking, mumbling and not listening.  Pt hearing voices.  the voices say "I'm immortal".  Pt noted to have flight of ideas and mumbling in triage.  Pt denies any drug use as per mother. pt's friend endorsed that they smoked K2 last week.

## 2019-10-19 NOTE — ED BEHAVIORAL HEALTH ASSESSMENT NOTE - RISK ASSESSMENT
pt is at acutely elevated risk of harm to self Low Acute Suicide Risk pt is at acutely elevated risk of harm to self.  Risk factors include history of bipolar disorder, acute psychosis, insomnia, lability, cannabis abuse and noted history of 2 past psychiatric hospitalizations in context of treatment non-compliance.  Protective factors include engaged in school and work with supportive family in place.

## 2019-10-19 NOTE — ED BEHAVIORAL HEALTH ASSESSMENT NOTE - MEDICAL ISSUES AND PLAN (INCLUDE STANDING AND PRN MEDICATION)
none hypokalemia supplemented by EM provider, medically cleared with no acute medical issues reported

## 2019-10-19 NOTE — ED PROVIDER NOTE - PROGRESS NOTE DETAILS
Ana Rosa: pt calmer, and more cooperative, pt seen by psych and will need admission pending labs. will continue to reassess.

## 2019-10-19 NOTE — ED BEHAVIORAL HEALTH ASSESSMENT NOTE - SUMMARY
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  Patient presents very agitated, appears suspicious, with disorganized thought process. Pt is unable to care for self at this time and requires inpatient psychiatric admission for safety and stabilization. 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  Patient presents very agitated, appears suspicious, with disorganized thought process. Pt is unable to care for self at this time and requires inpatient psychiatric admission for safety and stabilization. 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  Patient presents very agitated, appears suspicious, with disorganized thought process. Pt is unable to care for self at this time and requires inpatient psychiatric admission for safety and stabilization.  Admit to [facility] on a 9.39 legal status.  No need for constant observation in a locked, supervised setting.  Recommend EMS transportation to unit with buckle guard for safety.

## 2019-10-19 NOTE — ED PROVIDER NOTE - OBJECTIVE STATEMENT
17yo male student with a h/o schizophrenia brought in by his mom for medications noncompliance and psychiatric decompensation. Mom endorses that he has been having increasing paranoia and irrational thoughts. Pt agitated and uncooperative with conversation but states that if we "lock him up he will start killing people". 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 Unable to assess SI. He admits to smoking marijuana one week ago but denies other drug or alcohol use. 17yo male student with a h/o schizophrenia brought in by his mom for medications noncompliance and psychiatric decompensation. Mom endorses that he has been having increasing paranoia and irrational thoughts. Pt agitated and uncooperative with conversation but states that if we "lock him up he will start killing people". Mom also reports that he has not been sleeping. 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 Unable to assess SI. He admits to smoking marijuana one week ago but denies other drug or alcohol use.

## 2019-10-20 PROCEDURE — 99222 1ST HOSP IP/OBS MODERATE 55: CPT

## 2019-10-20 RX ORDER — BENZTROPINE MESYLATE 1 MG
1 TABLET ORAL
Refills: 0 | Status: DISCONTINUED | OUTPATIENT
Start: 2019-10-20 | End: 2019-11-05

## 2019-10-20 RX ORDER — BENZTROPINE MESYLATE 1 MG
2 TABLET ORAL ONCE
Refills: 0 | Status: COMPLETED | OUTPATIENT
Start: 2019-10-20 | End: 2019-10-20

## 2019-10-20 RX ADMIN — RISPERIDONE 1 MILLIGRAM(S): 4 TABLET ORAL at 20:38

## 2019-10-20 RX ADMIN — Medication 2 MILLIGRAM(S): at 09:28

## 2019-10-20 RX ADMIN — RISPERIDONE 1 MILLIGRAM(S): 4 TABLET ORAL at 01:08

## 2019-10-20 RX ADMIN — RISPERIDONE 1 MILLIGRAM(S): 4 TABLET ORAL at 08:01

## 2019-10-20 RX ADMIN — Medication 1 MILLIGRAM(S): at 20:38

## 2019-10-21 PROCEDURE — 99231 SBSQ HOSP IP/OBS SF/LOW 25: CPT

## 2019-10-21 RX ADMIN — Medication 1 MILLIGRAM(S): at 21:36

## 2019-10-21 RX ADMIN — Medication 1 MILLIGRAM(S): at 08:56

## 2019-10-21 RX ADMIN — RISPERIDONE 1 MILLIGRAM(S): 4 TABLET ORAL at 21:36

## 2019-10-21 RX ADMIN — RISPERIDONE 1 MILLIGRAM(S): 4 TABLET ORAL at 08:57

## 2019-10-22 PROCEDURE — 99231 SBSQ HOSP IP/OBS SF/LOW 25: CPT

## 2019-10-22 RX ORDER — RISPERIDONE 4 MG/1
2 TABLET ORAL AT BEDTIME
Refills: 0 | Status: DISCONTINUED | OUTPATIENT
Start: 2019-10-23 | End: 2019-11-05

## 2019-10-22 RX ORDER — RISPERIDONE 4 MG/1
1 TABLET ORAL AT BEDTIME
Refills: 0 | Status: COMPLETED | OUTPATIENT
Start: 2019-10-22 | End: 2019-10-22

## 2019-10-22 RX ADMIN — RISPERIDONE 1 MILLIGRAM(S): 4 TABLET ORAL at 09:18

## 2019-10-22 RX ADMIN — Medication 1 MILLIGRAM(S): at 20:36

## 2019-10-22 RX ADMIN — RISPERIDONE 1 MILLIGRAM(S): 4 TABLET ORAL at 20:36

## 2019-10-22 RX ADMIN — Medication 1 MILLIGRAM(S): at 09:18

## 2019-10-23 PROCEDURE — 99231 SBSQ HOSP IP/OBS SF/LOW 25: CPT

## 2019-10-23 RX ADMIN — Medication 1 MILLIGRAM(S): at 09:12

## 2019-10-23 RX ADMIN — RISPERIDONE 2 MILLIGRAM(S): 4 TABLET ORAL at 21:53

## 2019-10-23 RX ADMIN — Medication 1 MILLIGRAM(S): at 21:53

## 2019-10-24 PROCEDURE — 99231 SBSQ HOSP IP/OBS SF/LOW 25: CPT

## 2019-10-24 RX ADMIN — Medication 1 MILLIGRAM(S): at 09:18

## 2019-10-24 RX ADMIN — RISPERIDONE 2 MILLIGRAM(S): 4 TABLET ORAL at 20:53

## 2019-10-24 RX ADMIN — Medication 1 MILLIGRAM(S): at 20:53

## 2019-10-25 PROCEDURE — 99231 SBSQ HOSP IP/OBS SF/LOW 25: CPT

## 2019-10-25 RX ADMIN — Medication 1 MILLIGRAM(S): at 09:37

## 2019-10-25 RX ADMIN — Medication 1 MILLIGRAM(S): at 21:29

## 2019-10-25 RX ADMIN — RISPERIDONE 2 MILLIGRAM(S): 4 TABLET ORAL at 21:29

## 2019-10-26 RX ADMIN — RISPERIDONE 2 MILLIGRAM(S): 4 TABLET ORAL at 21:21

## 2019-10-26 RX ADMIN — Medication 1 MILLIGRAM(S): at 09:00

## 2019-10-26 RX ADMIN — Medication 1 MILLIGRAM(S): at 21:21

## 2019-10-27 RX ADMIN — Medication 1 MILLIGRAM(S): at 09:11

## 2019-10-27 RX ADMIN — HALOPERIDOL DECANOATE 5 MILLIGRAM(S): 100 INJECTION INTRAMUSCULAR at 16:35

## 2019-10-27 RX ADMIN — Medication 2 MILLIGRAM(S): at 16:35

## 2019-10-27 RX ADMIN — Medication 1 MILLIGRAM(S): at 20:48

## 2019-10-27 RX ADMIN — RISPERIDONE 2 MILLIGRAM(S): 4 TABLET ORAL at 20:48

## 2019-10-28 PROCEDURE — 99231 SBSQ HOSP IP/OBS SF/LOW 25: CPT

## 2019-10-28 RX ADMIN — RISPERIDONE 2 MILLIGRAM(S): 4 TABLET ORAL at 20:36

## 2019-10-28 RX ADMIN — Medication 1 MILLIGRAM(S): at 20:35

## 2019-10-28 RX ADMIN — Medication 1 MILLIGRAM(S): at 08:46

## 2019-10-29 PROCEDURE — 99231 SBSQ HOSP IP/OBS SF/LOW 25: CPT

## 2019-10-29 RX ADMIN — Medication 1 MILLIGRAM(S): at 08:39

## 2019-10-29 RX ADMIN — RISPERIDONE 2 MILLIGRAM(S): 4 TABLET ORAL at 21:20

## 2019-10-29 RX ADMIN — Medication 1 MILLIGRAM(S): at 21:20

## 2019-10-30 PROCEDURE — 99231 SBSQ HOSP IP/OBS SF/LOW 25: CPT

## 2019-10-30 RX ORDER — PALIPERIDONE 1.5 MG/1
234 TABLET, EXTENDED RELEASE ORAL ONCE
Refills: 0 | Status: COMPLETED | OUTPATIENT
Start: 2019-10-30 | End: 2019-10-30

## 2019-10-30 RX ORDER — PALIPERIDONE 1.5 MG/1
156 TABLET, EXTENDED RELEASE ORAL ONCE
Refills: 0 | Status: COMPLETED | OUTPATIENT
Start: 2019-11-05 | End: 2019-11-05

## 2019-10-30 RX ADMIN — Medication 1 MILLIGRAM(S): at 08:44

## 2019-10-30 RX ADMIN — Medication 1 MILLIGRAM(S): at 21:59

## 2019-10-30 RX ADMIN — RISPERIDONE 2 MILLIGRAM(S): 4 TABLET ORAL at 21:59

## 2019-10-30 RX ADMIN — PALIPERIDONE 234 MILLIGRAM(S): 1.5 TABLET, EXTENDED RELEASE ORAL at 15:01

## 2019-10-31 PROCEDURE — 99231 SBSQ HOSP IP/OBS SF/LOW 25: CPT

## 2019-10-31 RX ADMIN — RISPERIDONE 2 MILLIGRAM(S): 4 TABLET ORAL at 21:55

## 2019-10-31 RX ADMIN — Medication 1 MILLIGRAM(S): at 21:55

## 2019-10-31 RX ADMIN — Medication 1 MILLIGRAM(S): at 09:08

## 2019-11-01 PROCEDURE — 99231 SBSQ HOSP IP/OBS SF/LOW 25: CPT

## 2019-11-01 RX ADMIN — RISPERIDONE 2 MILLIGRAM(S): 4 TABLET ORAL at 21:43

## 2019-11-01 RX ADMIN — Medication 1 MILLIGRAM(S): at 21:43

## 2019-11-01 RX ADMIN — Medication 1 MILLIGRAM(S): at 09:49

## 2019-11-02 RX ADMIN — Medication 1 MILLIGRAM(S): at 21:17

## 2019-11-02 RX ADMIN — Medication 1 MILLIGRAM(S): at 08:40

## 2019-11-02 RX ADMIN — RISPERIDONE 2 MILLIGRAM(S): 4 TABLET ORAL at 21:17

## 2019-11-03 RX ORDER — FLUTICASONE PROPIONATE 50 MCG
1 SPRAY, SUSPENSION NASAL ONCE
Refills: 0 | Status: COMPLETED | OUTPATIENT
Start: 2019-11-03 | End: 2019-11-03

## 2019-11-03 RX ADMIN — Medication 1 SPRAY(S): at 22:27

## 2019-11-03 RX ADMIN — Medication 1 MILLIGRAM(S): at 09:02

## 2019-11-03 RX ADMIN — Medication 1 MILLIGRAM(S): at 20:31

## 2019-11-03 RX ADMIN — RISPERIDONE 2 MILLIGRAM(S): 4 TABLET ORAL at 20:31

## 2019-11-04 PROCEDURE — 99231 SBSQ HOSP IP/OBS SF/LOW 25: CPT

## 2019-11-04 RX ORDER — BENZTROPINE MESYLATE 1 MG
1 TABLET ORAL
Qty: 28 | Refills: 0
Start: 2019-11-04 | End: 2019-11-17

## 2019-11-04 RX ADMIN — Medication 1 MILLIGRAM(S): at 10:42

## 2019-11-04 RX ADMIN — RISPERIDONE 2 MILLIGRAM(S): 4 TABLET ORAL at 20:21

## 2019-11-04 RX ADMIN — Medication 1 MILLIGRAM(S): at 20:21

## 2019-11-05 VITALS — HEART RATE: 72 BPM | SYSTOLIC BLOOD PRESSURE: 121 MMHG | TEMPERATURE: 98 F | DIASTOLIC BLOOD PRESSURE: 68 MMHG

## 2019-11-05 PROCEDURE — 99239 HOSP IP/OBS DSCHRG MGMT >30: CPT

## 2019-11-05 RX ADMIN — PALIPERIDONE 156 MILLIGRAM(S): 1.5 TABLET, EXTENDED RELEASE ORAL at 12:14

## 2019-11-05 RX ADMIN — Medication 1 MILLIGRAM(S): at 09:09

## 2019-12-03 RX ORDER — PALIPERIDONE 1.5 MG/1
117 TABLET, EXTENDED RELEASE ORAL
Qty: 0 | Refills: 0 | DISCHARGE
Start: 2019-12-03

## 2021-04-01 ENCOUNTER — OUTPATIENT (OUTPATIENT)
Dept: OUTPATIENT SERVICES | Facility: HOSPITAL | Age: 21
LOS: 1 days | End: 2021-04-01
Payer: MEDICAID

## 2021-04-09 ENCOUNTER — INPATIENT (INPATIENT)
Facility: HOSPITAL | Age: 21
LOS: 23 days | Discharge: HOME | End: 2021-05-03
Attending: PSYCHIATRY & NEUROLOGY | Admitting: PSYCHIATRY & NEUROLOGY
Payer: COMMERCIAL

## 2021-04-09 VITALS
HEIGHT: 72 IN | SYSTOLIC BLOOD PRESSURE: 164 MMHG | OXYGEN SATURATION: 100 % | HEART RATE: 105 BPM | TEMPERATURE: 98 F | DIASTOLIC BLOOD PRESSURE: 100 MMHG | RESPIRATION RATE: 16 BRPM

## 2021-04-09 DIAGNOSIS — F31.9 BIPOLAR DISORDER, UNSPECIFIED: ICD-10-CM

## 2021-04-09 LAB
ALBUMIN SERPL ELPH-MCNC: 5.3 G/DL — HIGH (ref 3.3–5)
ALP SERPL-CCNC: 74 U/L — SIGNIFICANT CHANGE UP (ref 40–120)
ALT FLD-CCNC: 21 U/L — SIGNIFICANT CHANGE UP (ref 4–41)
ANION GAP SERPL CALC-SCNC: 16 MMOL/L — HIGH (ref 7–14)
AST SERPL-CCNC: 33 U/L — SIGNIFICANT CHANGE UP (ref 4–40)
BASOPHILS # BLD AUTO: 0.05 K/UL — SIGNIFICANT CHANGE UP (ref 0–0.2)
BASOPHILS NFR BLD AUTO: 0.7 % — SIGNIFICANT CHANGE UP (ref 0–2)
BILIRUB SERPL-MCNC: 1 MG/DL — SIGNIFICANT CHANGE UP (ref 0.2–1.2)
BUN SERPL-MCNC: 14 MG/DL — SIGNIFICANT CHANGE UP (ref 7–23)
CALCIUM SERPL-MCNC: 10.1 MG/DL — SIGNIFICANT CHANGE UP (ref 8.4–10.5)
CHLORIDE SERPL-SCNC: 102 MMOL/L — SIGNIFICANT CHANGE UP (ref 98–107)
CO2 SERPL-SCNC: 22 MMOL/L — SIGNIFICANT CHANGE UP (ref 22–31)
CREAT SERPL-MCNC: 0.95 MG/DL — SIGNIFICANT CHANGE UP (ref 0.5–1.3)
EOSINOPHIL # BLD AUTO: 0.02 K/UL — SIGNIFICANT CHANGE UP (ref 0–0.5)
EOSINOPHIL NFR BLD AUTO: 0.3 % — SIGNIFICANT CHANGE UP (ref 0–6)
GLUCOSE SERPL-MCNC: 104 MG/DL — HIGH (ref 70–99)
HCT VFR BLD CALC: 44.9 % — SIGNIFICANT CHANGE UP (ref 39–50)
HGB BLD-MCNC: 14.6 G/DL — SIGNIFICANT CHANGE UP (ref 13–17)
IANC: 4.82 K/UL — SIGNIFICANT CHANGE UP (ref 1.5–8.5)
IMM GRANULOCYTES NFR BLD AUTO: 0.1 % — SIGNIFICANT CHANGE UP (ref 0–1.5)
LYMPHOCYTES # BLD AUTO: 2.15 K/UL — SIGNIFICANT CHANGE UP (ref 1–3.3)
LYMPHOCYTES # BLD AUTO: 28.4 % — SIGNIFICANT CHANGE UP (ref 13–44)
MCHC RBC-ENTMCNC: 28.2 PG — SIGNIFICANT CHANGE UP (ref 27–34)
MCHC RBC-ENTMCNC: 32.5 GM/DL — SIGNIFICANT CHANGE UP (ref 32–36)
MCV RBC AUTO: 86.8 FL — SIGNIFICANT CHANGE UP (ref 80–100)
MONOCYTES # BLD AUTO: 0.53 K/UL — SIGNIFICANT CHANGE UP (ref 0–0.9)
MONOCYTES NFR BLD AUTO: 7 % — SIGNIFICANT CHANGE UP (ref 2–14)
NEUTROPHILS # BLD AUTO: 4.82 K/UL — SIGNIFICANT CHANGE UP (ref 1.8–7.4)
NEUTROPHILS NFR BLD AUTO: 63.5 % — SIGNIFICANT CHANGE UP (ref 43–77)
NRBC # BLD: 0 /100 WBCS — SIGNIFICANT CHANGE UP
NRBC # FLD: 0 K/UL — SIGNIFICANT CHANGE UP
PLATELET # BLD AUTO: 329 K/UL — SIGNIFICANT CHANGE UP (ref 150–400)
POTASSIUM SERPL-MCNC: 3.7 MMOL/L — SIGNIFICANT CHANGE UP (ref 3.5–5.3)
POTASSIUM SERPL-SCNC: 3.7 MMOL/L — SIGNIFICANT CHANGE UP (ref 3.5–5.3)
PROT SERPL-MCNC: 8.5 G/DL — HIGH (ref 6–8.3)
RBC # BLD: 5.17 M/UL — SIGNIFICANT CHANGE UP (ref 4.2–5.8)
RBC # FLD: 11.7 % — SIGNIFICANT CHANGE UP (ref 10.3–14.5)
SARS-COV-2 RNA SPEC QL NAA+PROBE: SIGNIFICANT CHANGE UP
SODIUM SERPL-SCNC: 140 MMOL/L — SIGNIFICANT CHANGE UP (ref 135–145)
TOXICOLOGY SCREEN, DRUGS OF ABUSE, SERUM RESULT: SIGNIFICANT CHANGE UP
TSH SERPL-MCNC: 1.1 UIU/ML — SIGNIFICANT CHANGE UP (ref 0.27–4.2)
WBC # BLD: 7.58 K/UL — SIGNIFICANT CHANGE UP (ref 3.8–10.5)
WBC # FLD AUTO: 7.58 K/UL — SIGNIFICANT CHANGE UP (ref 3.8–10.5)

## 2021-04-09 PROCEDURE — 99285 EMERGENCY DEPT VISIT HI MDM: CPT

## 2021-04-09 PROCEDURE — 93010 ELECTROCARDIOGRAM REPORT: CPT

## 2021-04-09 PROCEDURE — 99285 EMERGENCY DEPT VISIT HI MDM: CPT | Mod: 25

## 2021-04-09 RX ORDER — HALOPERIDOL DECANOATE 100 MG/ML
5 INJECTION INTRAMUSCULAR EVERY 6 HOURS
Refills: 0 | Status: DISCONTINUED | OUTPATIENT
Start: 2021-04-09 | End: 2021-05-03

## 2021-04-09 RX ORDER — DIPHENHYDRAMINE HCL 50 MG
50 CAPSULE ORAL ONCE
Refills: 0 | Status: DISCONTINUED | OUTPATIENT
Start: 2021-04-09 | End: 2021-05-03

## 2021-04-09 RX ORDER — DIPHENHYDRAMINE HCL 50 MG
50 CAPSULE ORAL ONCE
Refills: 0 | Status: DISCONTINUED | OUTPATIENT
Start: 2021-04-09 | End: 2021-04-09

## 2021-04-09 RX ORDER — HALOPERIDOL DECANOATE 100 MG/ML
5 INJECTION INTRAMUSCULAR ONCE
Refills: 0 | Status: DISCONTINUED | OUTPATIENT
Start: 2021-04-09 | End: 2021-05-03

## 2021-04-09 RX ORDER — BENZTROPINE MESYLATE 1 MG
1 TABLET ORAL
Refills: 0 | Status: DISCONTINUED | OUTPATIENT
Start: 2021-04-09 | End: 2021-04-27

## 2021-04-09 RX ORDER — RISPERIDONE 4 MG/1
1 TABLET ORAL
Refills: 0 | Status: DISCONTINUED | OUTPATIENT
Start: 2021-04-09 | End: 2021-04-12

## 2021-04-09 RX ORDER — DIPHENHYDRAMINE HCL 50 MG
50 CAPSULE ORAL EVERY 6 HOURS
Refills: 0 | Status: DISCONTINUED | OUTPATIENT
Start: 2021-04-09 | End: 2021-05-03

## 2021-04-09 RX ORDER — HALOPERIDOL DECANOATE 100 MG/ML
5 INJECTION INTRAMUSCULAR ONCE
Refills: 0 | Status: COMPLETED | OUTPATIENT
Start: 2021-04-09 | End: 2021-04-09

## 2021-04-09 RX ORDER — DIPHENHYDRAMINE HCL 50 MG
50 CAPSULE ORAL ONCE
Refills: 0 | Status: DISCONTINUED | OUTPATIENT
Start: 2021-04-09 | End: 2021-04-12

## 2021-04-09 RX ADMIN — Medication 1 MILLIGRAM(S): at 22:14

## 2021-04-09 RX ADMIN — Medication 2 MILLIGRAM(S): at 22:15

## 2021-04-09 RX ADMIN — Medication 50 MILLIGRAM(S): at 22:14

## 2021-04-09 RX ADMIN — Medication 2 MILLIGRAM(S): at 16:05

## 2021-04-09 RX ADMIN — HALOPERIDOL DECANOATE 5 MILLIGRAM(S): 100 INJECTION INTRAMUSCULAR at 22:14

## 2021-04-09 RX ADMIN — RISPERIDONE 1 MILLIGRAM(S): 4 TABLET ORAL at 22:14

## 2021-04-09 RX ADMIN — HALOPERIDOL DECANOATE 5 MILLIGRAM(S): 100 INJECTION INTRAMUSCULAR at 16:05

## 2021-04-09 NOTE — ED BEHAVIORAL HEALTH ASSESSMENT NOTE - INVOLUNTARY INTRAMUSCULAR MEDICATION DETAILS
see HPI/ ED course above - Haldol 5mg/ Ativan 2mg / Benadryl 50mg at 10:08am and then Haldol 5mg/ Ativan 2mg at 14:12 for 2 bouts of agitation with benefit. Haldol 5mg/ Ativan 2mg / Benadryl 50mg

## 2021-04-09 NOTE — ED ADULT NURSE NOTE - CHIEF COMPLAINT QUOTE
pt sent in by family for auditory hallucinations, acting agitated at home. PMH schizophrenia- as per mother pt was admitted at [facility] last year, has been noncompliant with medication since. Pt calm and cooperative, talking to self in triage. Denies SI/HI. Mother Amairani [phone]

## 2021-04-09 NOTE — BH PATIENT PROFILE - HOME MEDICATIONS
benztropine 1 mg oral tablet , 1 tab(s) orally 2 times a day  Shirley Sustenna 117 mg/0.75 mL intramuscular suspension, extended release , 117 milligram(s) intramuscular once a month

## 2021-04-09 NOTE — ED BEHAVIORAL HEALTH ASSESSMENT NOTE - SUMMARY
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  Patient presents very agitated, appears suspicious, with disorganized thought process. Pt is unable to care for self at this time and requires inpatient psychiatric admission for safety and stabilization.  Admit to [facility] on a 9.39 legal status.  No need for constant observation in a locked, supervised setting.  Recommend EMS transportation to unit with buckle guard for safety. 21 y/o -American male, single, noncaregiver, lives with parents and 2 siblings, charted diagnoses of Bipolar Disorder with psychosis, history of cannabis and K2 abuse, 3 past psych hospitalizations, first in 2014 at [facility] for psychosis and most recently at [facility] from 10/19/19-11/5/19, no current outpatient provider, noncompliant with psychiatric treatment since [facility] discharge, no history of self-injurious behavior or suicide attempts, no h/o violence or legal issues, no PMH, +regular cannabis and K2 use, brought in by cousin for not sleeping, paranoia, grandiosity, and disorganized thought process in context of medication noncompliance and likely ongoing cannabis and k2 use.    Patient presents very agitated, with disorganized thought process. He is acutely manic with pressured speech, decreased need for sleep, racing thoughts and grandiose delusions. Pt is unable to care for self at this time and requires inpatient psychiatric admission for safety and stabilization.  Admit to [facility] on a 9.39 legal status.  No need for constant observation in a locked, supervised setting.  Recommend EMS transportation to unit with buckle guard for safety.
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
## 2021-04-09 NOTE — ED ADULT NURSE NOTE - OBJECTIVE STATEMENT
Pt arrived to  with cousin. As per family, pt has been noncompliant with medications and has had worsening agitation at home. Pt hyperverbal during intake. Pt changed into  clothing. Personal property collected and logged.

## 2021-04-09 NOTE — ED BEHAVIORAL HEALTH ASSESSMENT NOTE - PSYCHIATRIC ISSUES AND PLAN (INCLUDE STANDING AND PRN MEDICATION)
Start on Risperdal 1mg BID for treatment of psychosis; use Haldol 5 mg, Ativan 2 mg PO/IM prn agitation Start on Risperdal 1mg BID for treatment of psychosis; use Haldol 5 mg, Ativan 2 mg and Benadryl 50mg PO/IM prn agitation

## 2021-04-09 NOTE — ED BEHAVIORAL HEALTH ASSESSMENT NOTE - PRIOR MEDICATION SIDE EFFECTS OR ADVERSE REACTIONS
Speech Language Pathology  Dysphagia Treatment Note/Cognitive Evaluation     Name:  Valente Russo  :   1976  Medical Diagnosis:  Alcohol withdrawal seizure, uncomplicated ([facility] [address] [G33.404]  Alcohol withdrawal seizure, uncomplicated ([facility] [address] [H63.301]  Delirium tremens ([facility] [F10.231]  Treatment Diagnosis: Oropharyngeal Dysphagia  Pain level:  Pt did not report pain    Current Diet Level: General and Moderately thick liquids; no drinking straw    Tolerance of Current Diet Level:Per chart review, no documented difficulties with current diet level noted. Assessment of Texture Tolerance:  -Impressions: Pt was positioned upright in bed on RA. Pt with thin water on bedside table. Reported he had been drinking it with no difficulty. Trials of thin liquids, Mildly Thick (Nectar) Liquids and regular solids were provided. Pt again demonstrated frequent throat clearing prior to PO trials. Pt was mildly impulsive with liquids intake. Pt demonstrated adequate mastication and bolus propulsion. Swallow timing and laryngeal elevation appeared functional. With PO intake pt continued to demonstrate intermittent throat clearing. Overall pt appeared to demonstrate gross overall tolerance of Regular solids and thin liquids. Educated pt on safe swallow strategies and aspiration precautions however, carryover was poor. Diet and Treatment Recommendations:  1.) Regular Textures with thin liquids, no straws, meds crushed in puree  2.) Assistance with feeding, ensure Pt takes small drinks  3.) If difficulty is noted, recommend downgrade to NPO with ongoing swallowing assessment     Dysphagia Goals:  1.) Pt will tolerate ongoing assessment for po diet readiness (ongoing 2020)   2.) Pt will tolerated staff assisted tsp presentation of moderately thick (honey) and puree without s/s of penetration/aspiration (ongoing 2020)     Cognitive Evaluation  Pt presented with slightly improved cognition this date.  Pt oriented to None known

## 2021-04-09 NOTE — BH PATIENT PROFILE - NSSBIRTSIXOCC_GEN_A_CORE
[facility]  [address]  [facility] [address]  Phone: [phone]  Fax: [phone] January 20, 2017      Orestes Santana MD  [address]  [facility] [address]    Patient: Avis Graves   MR Number: [id number]   YOB: 1975   Date of Visit: 1/18/2017     Dear Dr. Santana:    Thank you for referring Avis Graves to me for evaluation. Below are the relevant portions of my assessment and plan of care.    As you know, Ms. Graves is a 41-year-old postmenopausal female referred for evaluation of implant following recently diagnosed carcinoma of the right breast. She was initially referred for surgical evaluation of a breast lump on December 28, 2016 during an exam and mammogram.  This showed signs of suspicious focal assymmetry. A stereotactic biopsy was performed on January 5, 2017, with pathology revealing infiltrating ductal carcinoma of the breast. She routinely performs self breast exams. She has noticed a change on breast exam. She denies recent nipple discharge, though she does note nipple discharge that spontaneously resolved over 10 years ago. She admits to a previous lumpectomy when she was 17 years old. She denies a personal history of breast cancer. She is here today to discuss placement of a right breast implant.     The procedure along with the risks, benefits and post operative course were discussed with Ms. Graves, her  and parents. All questions were answered and she is in agreement with the plan. We will proceed with right mastectomy with tissue expander placement and left mastopexy on January 30, 2017. Her genetic testing is pending and surgical plans could change based on those results. Will discuss patient with Dr. Santana     If you have questions, please do not hesitate to call me.     Sincerely,        Bam Nunez MD  Section of Plastic & Reconstructive Surgery  [facility]     CECILE/dr MCKENZIE Sánchez  TU Rivas Jr., MD      Never

## 2021-04-09 NOTE — ED PROVIDER NOTE - CLINICAL SUMMARY MEDICAL DECISION MAKING FREE TEXT BOX
Labs, Urine Tox/UA, EKG    Medical evaluation performed. There is no clinical evidence of intoxication or any acute medical problem requiring immediate intervention. Patient is awaiting psychiatric consultation. Final disposition will be determined by psychiatrist. 21 y/o M hx Schizophrenia, ADHD  Labs, Urine Tox/UA, EKG    Medical evaluation performed. There is no clinical evidence of intoxication or any acute medical problem requiring immediate intervention. Patient is awaiting psychiatric consultation. Final disposition will be determined by psychiatrist.

## 2021-04-09 NOTE — ED BEHAVIORAL HEALTH ASSESSMENT NOTE - MEDICAL ISSUES AND PLAN (INCLUDE STANDING AND PRN MEDICATION)
hypokalemia supplemented by EM provider, medically cleared with no acute medical issues reported NONE

## 2021-04-09 NOTE — ED ADULT TRIAGE NOTE - CHIEF COMPLAINT QUOTE
pt sent in by family for auditory hallucinations, acting agitated at home. Unknown of past psychiatric history- as per mother pt was admitted at [facility] last year, has been noncompliant with medication since. Pt calm and cooperative, talking to self in triage. Denies SI/HI. Mother Amairani [phone] pt sent in by family for auditory hallucinations, acting agitated at home. PMH schizophrenia- as per mother pt was admitted at [facility] last year, has been noncompliant with medication since. Pt calm and cooperative, talking to self in triage. Denies SI/HI. Mother Amairani [phone]

## 2021-04-09 NOTE — ED BEHAVIORAL HEALTH ASSESSMENT NOTE - DESCRIPTION
none see HPI irritable, agitated with emergent IM medications required for safety and on triage required Haldol 5mg/Ativan 2mg IM at 10:08am followed by Benadryl 50mg IM at 10:20 with benefit.  At time before transfer to [facility] patient reportedly attempted to elope and required IM of Haldol 5mg/ Ativan 2mg at 14:12 with benefit and was transferred safely.    Vital Signs Last 24 Hrs  T(C): 36.7 (19 Oct 2019 09:49), Max: 36.7 (19 Oct 2019 09:49)  T(F): 98.1 (19 Oct 2019 09:49), Max: 98.1 (19 Oct 2019 09:49)  HR: 107 (19 Oct 2019 09:49) (107 - 107)  BP: 156/84 (19 Oct 2019 09:49) (156/84 - 156/84)  BP(mean): --  RR: 18 (19 Oct 2019 09:49) (18 - 18)  SpO2: 99% (19 Oct 2019 09:49) (99% - 99%) agitated, restless, moving around triage, making inappropriate gestures.   Vital Signs Last 24 Hrs  T(C): 36.7 (09 Apr 2021 12:38), Max: 36.7 (09 Apr 2021 12:38)  T(F): 98.1 (09 Apr 2021 12:38), Max: 98.1 (09 Apr 2021 12:38)  HR: 105 (09 Apr 2021 12:38) (105 - 105)  BP: 164/100 (09 Apr 2021 12:38) (164/100 - 164/100)  BP(mean): --  RR: 16 (09 Apr 2021 12:38) (16 - 16)  SpO2: 100% (09 Apr 2021 12:38) (100% - 100%) see HPI, not in school

## 2021-04-09 NOTE — ED BEHAVIORAL HEALTH ASSESSMENT NOTE - OTHER PAST PSYCHIATRIC HISTORY (INCLUDE DETAILS REGARDING ONSET, COURSE OF ILLNESS, INPATIENT/OUTPATIENT TREATMENT)
charted diagnoses of Bipolar Disorder with psychosis, history of cannabis abuse, 2 past psych hospitalization, first in 2014 at [facility] for psychosis and most recently at [facility] from 5/24-6/5/19, attends [facility] and most recently has seen psychiatrist, Dr. Bundy on 10/15/19 charted diagnoses of Bipolar Disorder with psychosis, history of cannabis abuse, 2 past psych hospitalization, first in 2014 at [facility] for psychosis and most recently at [facility] from 10/19/19-11/05/2019,   no outpatient treatment.

## 2021-04-09 NOTE — ED BEHAVIORAL HEALTH NOTE - BEHAVIORAL HEALTH NOTE
Writer called Amairani [phone] pt's mother to inform her patient will be transferred to [facility] at [facility].  Pt has not had covid.  Pt's mother and father tested positive two weeks ago.  Pt was tested in a van near their [place] [phone] specimen #[id number]  and tested negative.  Pt has not travelled.  Pt does not have sleep apnea.
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
## 2021-04-09 NOTE — ED BEHAVIORAL HEALTH ASSESSMENT NOTE - HPI (INCLUDE ILLNESS QUALITY, SEVERITY, DURATION, TIMING, CONTEXT, MODIFYING FACTORS, ASSOCIATED SIGNS AND SYMPTOMS)
21 y/o -American male, single, noncaregiver, lives with parents and 2 siblings, charted diagnoses of Bipolar Disorder with psychosis, history of cannabis and K2 abuse, 3 past psych hospitalizations, first in 2014 at [facility] for psychosis and most recently at [facility] from 10/19/19-11/5/19, no current outpatient provider, noncompliant with psychiatric treatment since [facility] discharge, no history of self-injurious behavior or suicide attempts, no h/o violence or legal issues, no PMH, +regular cannabis and K2 use, brought in by cousin for not sleeping, paranoia, grandiosity, and disorganized thought process in context of medication noncompliance and likely ongoing cannabis and k2 use.    On arrival to  ED, pt was very agitated and was not responding to verbal redirection.   Patient was interviewed and observed on both triage.  On assessment, pt is superficially cooperative, guarded, and exhibits grossly disorganized thought process. He appears suspicious at times. Pt provides disorganized response when asked why his mother brought him to the ED.  Patient notably makes statements that he's the "champion" and starts shadow boxing, unable to rationally participate in interview.  Patient is noted to be hyper and seen talking to himself prior to being medicated, acutely labile.  When asked about events leading up to him receiving IM medications earlier, pt says "I was anxious" but is unable/unwilling to elaborate.  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  Patient has not been taking his meds and is visibly paranoid, suspicious on exam with noted persecutory delusions and grandiosity.  Patient had reportedly tried to put his mother's house up for sale for 3 million dollars, despite this being a house owned by mother and not worth that amount.  Patient has not been sleeping and been not taking meds regularly, notably was exhibiting ideas of reference, delusions and missionary ideas.  Patient is acutely agitated on presentation and is exhibiting racing thoughts, flight of ideas not able to linearly participate in interview.  He denies AH/VH but appears to be internally preoccupied.  He denies SI/HI.  He denies depression.  He denies having a psychiatric condition.  Pt is reported to have been using marijuana but unsure of frequency, mother reports known use on 10/15.  Patient is acutely manic, labile with agitation and unable to maintain behavioral control.    Collateral obtained from pt's mother Amairani, father Addison Mohr and uncle, jatinder who report patient has been disruptive at school yesterday, hyper and seen to be talking to himself.  Patient has been irrational and expressed concern for his family's safety but unable to explain why.  Deny SI or HI but express patient has not been sleeping and advocate for admissoin. 19 y/o -American male, single, noncaregiver, lives with parents and 2 siblings, charted diagnoses of Bipolar Disorder with psychosis, history of cannabis and K2 abuse, 3 past psych hospitalizations, first in 2014 at [facility] for psychosis and most recently at [address] from 10/19/19-11/5/19, no current outpatient provider, noncompliant with psychiatric treatment since [facility] discharge, no history of self-injurious behavior or suicide attempts, no h/o violence or legal issues, no PMH, +regular cannabis and K2 use, brought in by cousin for not sleeping, paranoia, grandiosity, and disorganized thought process in context of medication noncompliance and likely ongoing cannabis and k2 use.    On arrival to  ED, pt was very agitated, restless and difficult to interview.    Patient was interviewed and observed in triage.  On assessment, pt has a grossly disorganized thought process. Patient is noted to be hyper and seen talking to himself and continues addressing his cousin and laughing to himself. He was mumbling and it was difficult to understand what he was saying but said that he came to [address] [place] [address] to get help like last time and he may need it again but then started laughing more to himself. He started talking about the clothing he was wearing and said he had to change. Pt was wearing multiple gold chains around his neck. He then proceeded to compliment writer's hair and show his tattoos and ask if she knew what it meant.   Patient has not been sleeping and been not taking meds notably was exhibiting ideas of reference and grandiose delusions. Patient is acutely agitated on presentation and is exhibiting racing thoughts and states he's been having violent thoughts and flight of ideas not able to linearly participate in interview.  He denies AH/VH but appears to be internally preoccupied. He denies SI/HI.  He denies depression. Pt is reported to have been using marijuana but unsure of frequency, and then started laughing saying "it was called grass, green, marijuana" and exhibiting gestures on how to smoke marijuana. Patient is acutely manic, labile with agitation and unable to maintain behavioral control.    While patient was changing he pulled back the curtain and started making sexual comments towards writer and required redirection.       Collateral obtained from pt's mother Amairani in  Note.

## 2021-04-09 NOTE — ED PROVIDER NOTE - DISPOSITION TYPE
PICC dressing lifting around edges of tegaderm.  Dressing changed using sterile technique.  5.5 cm out, in agreement with PICC sheet in chart.  Infant tolerated without complication.   ADMIT

## 2021-04-09 NOTE — ED ADULT NURSE NOTE - NSIMPLEMENTINTERV_GEN_ALL_ED
Implemented All Universal Safety Interventions:  [place] to call system. Call bell, personal items and telephone within reach. Instruct patient to call for assistance. Room bathroom lighting operational. Non-slip footwear when patient is off stretcher. Physically safe environment: no spills, clutter or unnecessary equipment. Stretcher in lowest position, wheels locked, appropriate side rails in place.

## 2021-04-09 NOTE — ED BEHAVIORAL HEALTH NOTE - BEHAVIORAL HEALTH NOTE
COVID Exposure Screen- collateral (i.e. third-party, chart review, belongings, etc; include EMS and ED staff)  1.	*Has the patient had a COVID-19 test in the last 90 days?  (  x) Yes   (  ) No   (  ) Unknown- Reason: _____  IF YES PROCEED TO QUESTION #2. IF NO OR UNKNOWN, PLEASE SKIP TO QUESTION #3.  2.	Date of test(s) and result(s): ___March 29 - NEGATIVE_____  3.	*Has the patient tested positive for COVID-19 antibodies? (  ) Yes   ( x ) No   (  ) Unknown- Reason: _____  IF YES PROCEED TO QUESTION #4. IF NO or UNKNOWN, PLEASE SKIP TO QUESTION #5.  4.	Date of positive antibody test: ________  5.	*Has the patient received 2 doses of the COVID-19 vaccine? (  ) Yes   (  ) No   (  ) Unknown- Reason: _____  IF YES PROCEED TO QUESTION #6. IF NO or UNKNOWN, PLEASE SKIP TO QUESTION #7.  6.	 Date of second dose: ________  7.	*In the past 10 days, has the patient been around anyone with a positive COVID-19 test?* (  x) Yes (parents tested + with a rapid 11 days ago)   (  ) No   (  ) Unknown- Reason: __  IF YES PROCEED TO QUESTION #8. IF NO or UNKNOWN, PLEASE SKIP TO QUESTION #13.  8.	Was the patient within 6 feet of them for at least 15 minutes? (x  ) Yes   (  ) No   (  ) Unknown- Reason: _____  9.	Did the patient provide care for them? (  ) Yes   (  ) No   (  ) Unknown- Reason: ______  10.	Did the patient have direct physical contact with them (touched, hugged, or kissed them)? (  ) Yes   (  ) No    (  ) Unknown- Reason: __  11.	Did the patient share eating or drinking utensils with them? (  ) Yes   (  ) No    (  ) Unknown- Reason: ____  12.	Did they sneeze, cough, or somehow get respiratory droplets on the patient? (  ) Yes   (  ) No    (  ) Unknown- Reason: ______  13.	*Has the patient been out of [place] within the past 10 days?* (  ) Yes   (  x) No   (  ) Unknown- Reason: _____  IF YES PLEASE ANSWER THE FOLLOWING QUESTIONS:  14.	Which state/country did they go to? ______  15.	Were they there over 24 hours? (  ) Yes   (  ) No    (  ) Unknown- Reason: ______  16.	Date of return to [facility]: ______

## 2021-04-09 NOTE — ED ADULT NURSE NOTE - NS_BH TRG QUESTION4_ED_ALL_ED
Doing well, continuing with Rx phenergan, still has some N/V  C/o heartburn- recommended OTC medications  Continuing with PO iron   Reviewed FKCs and PTL s/s   Desires epidural   Considering BTL     Coffective counseling sheet Protect Breastfeeding discussed with mother. Reinforced avoidence of artifical nipples and formula, unless medically indicated.  Encouraged mother to download Coffective mobile esha if she has not already done so. Mother verbalizes understanding.    MILENA SALAS   
No

## 2021-04-09 NOTE — ED BEHAVIORAL HEALTH ASSESSMENT NOTE - OTHER
mother CVM, sunrise [id number] restless, agitated seen talking to self, appears internally preoccupied paranoia per mother; pt appears suspicious

## 2021-04-09 NOTE — ED BEHAVIORAL HEALTH ASSESSMENT NOTE - RISK ASSESSMENT
Low Acute Suicide Risk pt is at acutely elevated risk of harm to self.  Risk factors include history of bipolar disorder, acute psychosis, insomnia, lability, cannabis abuse and noted history of 2 past psychiatric hospitalizations in context of treatment non-compliance.  Protective factors include engaged in school and work with supportive family in place.

## 2021-04-09 NOTE — ED BEHAVIORAL HEALTH ASSESSMENT NOTE - DETAILS
CHICO Guzman no history of suicidality, no history of suicide attempts informed mother Lexi Ibarra NP on L4

## 2021-04-10 DIAGNOSIS — F19.10 OTHER PSYCHOACTIVE SUBSTANCE ABUSE, UNCOMPLICATED: ICD-10-CM

## 2021-04-10 LAB
A1C WITH ESTIMATED AVERAGE GLUCOSE RESULT: 4.8 % — SIGNIFICANT CHANGE UP (ref 4–5.6)
CHOLEST SERPL-MCNC: 160 MG/DL — SIGNIFICANT CHANGE UP
COVID-19 NUCLEOCAPSID GAM AB INTERP: POSITIVE
COVID-19 NUCLEOCAPSID TOTAL GAM ANTIBODY RESULT: 1.66 INDEX — HIGH
ESTIMATED AVERAGE GLUCOSE: 91 MG/DL — SIGNIFICANT CHANGE UP (ref 68–114)
HDLC SERPL-MCNC: 37 MG/DL — LOW
LIPID PNL WITH DIRECT LDL SERPL: 106 MG/DL — HIGH
NON HDL CHOLESTEROL: 123 MG/DL — SIGNIFICANT CHANGE UP
SARS-COV-2 IGG+IGM SERPL QL IA: 1.66 INDEX — HIGH
SARS-COV-2 IGG+IGM SERPL QL IA: POSITIVE
TRIGL SERPL-MCNC: 86 MG/DL — SIGNIFICANT CHANGE UP

## 2021-04-10 PROCEDURE — 99222 1ST HOSP IP/OBS MODERATE 55: CPT

## 2021-04-10 RX ADMIN — Medication 1 MILLIGRAM(S): at 09:09

## 2021-04-10 RX ADMIN — Medication 2 MILLIGRAM(S): at 21:09

## 2021-04-10 RX ADMIN — Medication 50 MILLIGRAM(S): at 21:09

## 2021-04-10 RX ADMIN — HALOPERIDOL DECANOATE 5 MILLIGRAM(S): 100 INJECTION INTRAMUSCULAR at 21:09

## 2021-04-10 RX ADMIN — Medication 1 MILLIGRAM(S): at 21:09

## 2021-04-10 RX ADMIN — RISPERIDONE 1 MILLIGRAM(S): 4 TABLET ORAL at 21:09

## 2021-04-10 RX ADMIN — RISPERIDONE 1 MILLIGRAM(S): 4 TABLET ORAL at 09:10

## 2021-04-10 NOTE — BH INPATIENT PSYCHIATRY ASSESSMENT NOTE - NSCDADMBILLING_PSY_A_CORE
67643 - Initial hospital care - moderate complexity 33115 - Psychiatric diagnostic evaluation with medical services 45723 - Initial hospital care - moderate complexity

## 2021-04-10 NOTE — BH INPATIENT PSYCHIATRY ASSESSMENT NOTE - RISK ASSESSMENT
pt is at acutely elevated risk of harm to self.  Risk factors include history of bipolar disorder, acute psychosis, insomnia, lability, cannabis abuse and noted history of 2 past psychiatric hospitalizations in context of treatment non-compliance.  Protective factors include engaged in school and work with supportive family in place.

## 2021-04-10 NOTE — PSYCHIATRIC REHAB INITIAL EVALUATION - NSBHPRRECOMMEND_PSY_ALL_CORE
Writer met with patient in order to orient patient to unit and briefly introduce patient to psychiatric rehabilitation staff and department function. Patient was provided with a copy of unit schedule. Patient is an unreliable historian as patient is disorganized and unable to tolerate structure of session. Chart reports that patient is currently admitted due to paranoia, poor sleep, and disorganized thought process in the context of medication non-compliance. Writer and patient established a collaborative psychiatric rehabilitation goal. Writer encouraged patient to attend psychiatric rehabilitation groups and engage in treatment. Psychiatric rehabilitation staff will engage patient daily.

## 2021-04-10 NOTE — BH INPATIENT PSYCHIATRY ASSESSMENT NOTE - HPI (INCLUDE ILLNESS QUALITY, SEVERITY, DURATION, TIMING, CONTEXT, MODIFYING FACTORS, ASSOCIATED SIGNS AND SYMPTOMS)
Patient was seen and evaluated, chart reviewed. Case discussed with nursing team.  On service for this 20 yr odl AA male with PPH of Bipolar Disorder.  Patient is hospitalized with a primary problem of psychotic decompensation in context on noncompliance with medications and polysubstance abuse.  Patient admitted to [facility] on  legal status. I have reviewed the initial psychiatric assessment in the electronic medical record, including the history of present illness, past psychiatric history, family/social history (no pertinent changes), and exam, and have confirmed the salient findings dated 21.  As per chart review, transferring records indicated the followin y/o -American male, single, noncaregiver, lives with parents and 2 siblings, charted diagnoses of Bipolar Disorder with psychosis, history of cannabis and K2 abuse, 3 past psych hospitalizations, first in  at [facility] for psychosis and most recently at [address] from 10/19/19-19, no current outpatient provider, noncompliant with psychiatric treatment since [facility] discharge, no history of self-injurious behavior or suicide attempts, no h/o violence or legal issues, no PMH, +regular cannabis and K2 use, brought in by cousin for not sleeping, paranoia, grandiosity, and disorganized thought process in context of medication noncompliance and likely ongoing cannabis and k2 use.  On arrival to  ED, pt was very agitated, restless and difficult to interview.  Patient was interviewed and observed in triage.  On assessment, pt has a grossly disorganized thought process. Patient is noted to be hyper and seen talking to himself and continues addressing his cousin and laughing to himself. He was mumbling and it was difficult to understand what he was saying but said that he came to [address] [place] [address] to get help like last time and he may need it again but then started laughing more to himself. He started talking about the clothing he was wearing and said he had to change. Pt was wearing multiple gold chains around his neck. He then proceeded to compliment writer's hair and show his tattoos and ask if she knew what it meant.   Patient has not been sleeping and been not taking meds notably was exhibiting ideas of reference and grandiose delusions. Patient is acutely agitated on presentation and is exhibiting racing thoughts and states he's been having violent thoughts and flight of ideas not able to linearly participate in interview.  He denies AH/VH but appears to be internally preoccupied. He denies SI/HI.  He denies depression. Pt is reported to have been using marijuana but unsure of frequency, and then started laughing saying "it was called grass, green, marijuana" and exhibiting gestures on how to smoke marijuana. Patient is acutely manic, labile with agitation and unable to maintain behavioral control.  While patient was changing he pulled back the curtain and started making sexual comments towards writer and required redirection.     On unit:  Information Received From: Chart review and patient interview  Patient is followed up for Bipolar Disorder, admitted for psychosis in context of noncompliance with medications.  Chart, medications and labs reviewed.  Patient is discussed with nursing staff. No significant overnight issues. Patient also presents with substance abuse, regular cannabis and K2 use.  Patient unsure of frequency/amount of use,  u-tox +cannabis. Patient is observed pacing hallway, appeared sedated, with unsteady gait.  Patient is escorted back to room with nursing staff and writer.  Patient is notably disorganized, appears paranoid, very guarded, suspicious of writer.   When questioned about his mood and psychotic symptoms, there is some latency. Interview was challenging due to patient’s profound disorganization.  Patient’s story came in fits and fragments, difficult to follow, then would close eyes intermittently, appeared to be asleep. Patient is profoundly disorganized with ongoing perceptual disturbances, exhibiting ideas of reference and grandiose delusions, racing thoughts. He denies SI/HI, denies all psychotic features, despite psychotic symptoms successfully elicited.  At times mumbling to self, talking to self, laughing inappropriately to self during interview.  Unable to fully participate in meaningful interview due to severity of psychotic/disorganized and paranoid symptoms. Patient has not slept in several days prior to admission. Per nursing patient received po prn medications Haldol 5mg Ativan 2mg and Benadryl 50mg, able to sleep last night throughout the night, no appetite concerns.  Remains compliant with medications on unit.  Per chart review patient has potential to be aggressive. Has not had any behavioral issues on unit requiring IM medications.  Continue to provide therapeutic support.

## 2021-04-10 NOTE — BH INPATIENT PSYCHIATRY ASSESSMENT NOTE - NSICDXPASTMEDICALHX_GEN_ALL_CORE_FT
PAST MEDICAL HISTORY:  ADHD (attention deficit hyperactivity disorder)     Schizophrenia, unspecified type

## 2021-04-10 NOTE — BH INPATIENT PSYCHIATRY ASSESSMENT NOTE - CURRENT MEDICATION
MEDICATIONS  (STANDING):  benztropine 1 milliGRAM(s) Oral two times a day  risperiDONE  Disintegrating Tablet 1 milliGRAM(s) Oral two times a day    MEDICATIONS  (PRN):  diphenhydrAMINE 50 milliGRAM(s) Oral every 6 hours PRN eps ppx/agitation  diphenhydrAMINE   Injectable 50 milliGRAM(s) IntraMuscular once PRN eps ppx/severe agitation  diphenhydrAMINE   Injectable 50 milliGRAM(s) IntraMuscular once PRN eps ppx/severe agitation  haloperidol     Tablet 5 milliGRAM(s) Oral every 6 hours PRN agitation  haloperidol    Injectable 5 milliGRAM(s) IntraMuscular once PRN severe agitation  LORazepam     Tablet 2 milliGRAM(s) Oral every 6 hours PRN agitation  LORazepam   Injectable 2 milliGRAM(s) IntraMuscular once PRN severe agitation

## 2021-04-10 NOTE — BH INPATIENT PSYCHIATRY ASSESSMENT NOTE - OTHER PAST PSYCHIATRIC HISTORY (INCLUDE DETAILS REGARDING ONSET, COURSE OF ILLNESS, INPATIENT/OUTPATIENT TREATMENT)
charted diagnoses of Bipolar Disorder with psychosis, history of cannabis abuse, 2 past psych hospitalization, first in 2014 at [facility] for psychosis and most recently at [facility] from 10/19/19-11/05/2019,   no outpatient treatment.

## 2021-04-10 NOTE — BH INPATIENT PSYCHIATRY ASSESSMENT NOTE - NSBHCHARTREVIEWVS_PSY_A_CORE FT
Vital Signs Last 24 Hrs  T(C): 36.3 (10 Apr 2021 06:24), Max: 37.1 (09 Apr 2021 20:59)  T(F): 97.3 (10 Apr 2021 06:24), Max: 98.7 (09 Apr 2021 20:59)  HR: 99 (09 Apr 2021 20:59) (99 - 105)  BP: 149/94 (09 Apr 2021 20:59) (148/89 - 164/100)  BP(mean): --  RR: 16 (09 Apr 2021 20:59) (16 - 16)  SpO2: 100% (09 Apr 2021 20:59) (100% - 100%)

## 2021-04-10 NOTE — BH INPATIENT PSYCHIATRY ASSESSMENT NOTE - CASE SUMMARY
Pt is a 21 y/o AA male, single, noncaregiver, lives with parents and 2 siblings,  diagnoses of Bipolar Disorder with psychosis, history of cannabis and K2 abuse, 3 past psych hospitalizations, first in 2014 at [facility] for psychosis and most recently at [facility] from 10/19/19-11/5/19, no current outpatient provider, noncompliant with psychiatric treatment since [facility] discharge, no history of self-injurious behavior or suicide attempts, no h/o violence or legal issues, no PMH, +regular cannabis and K2 use, brought in by cousin for not sleeping, paranoia, grandiosity, and disorganized thought process in context of medication noncompliance and likely ongoing cannabis and k2 use.  Patient continues to have labile mood, and difficulty concentrating and focusing on tasks. He continues to require inpatient psychiatric hospitalization for his Bipolar disorder.  He is requesting medication to help with his concentration and mood.  He denies any suicidal thoughts and has no thoughts of harming himself or others.

## 2021-04-10 NOTE — BH INPATIENT PSYCHIATRY ASSESSMENT NOTE - NSBHASSESSSUMMFT_PSY_ALL_CORE
Pt is a 21 y/o AA male, single, noncaregiver, lives with parents and 2 siblings,  diagnoses of Bipolar Disorder with psychosis, history of cannabis and K2 abuse, 3 past psych hospitalizations, first in 2014 at [facility] for psychosis and most recently at [facility] from 10/19/19-11/5/19, no current outpatient provider, noncompliant with psychiatric treatment since [facility] discharge, no history of self-injurious behavior or suicide attempts, no h/o violence or legal issues, no PMH, +regular cannabis and K2 use, brought in by cousin for not sleeping, paranoia, grandiosity, and disorganized thought process in context of medication noncompliance and likely ongoing cannabis and k2 use.  Patient continues to have labile mood, and difficulty concentrating and focusing on tasks. He continues to require inpatient psychiatric hospitalization for his Bipolar disorder.

## 2021-04-11 LAB
COVID-19 SPIKE DOMAIN AB INTERP: POSITIVE
COVID-19 SPIKE DOMAIN ANTIBODY RESULT: 6.52 U/ML — HIGH
SARS-COV-2 IGG+IGM SERPL QL IA: 6.52 U/ML — HIGH
SARS-COV-2 IGG+IGM SERPL QL IA: POSITIVE

## 2021-04-11 PROCEDURE — 99232 SBSQ HOSP IP/OBS MODERATE 35: CPT

## 2021-04-11 RX ADMIN — Medication 1 MILLIGRAM(S): at 08:55

## 2021-04-11 RX ADMIN — RISPERIDONE 1 MILLIGRAM(S): 4 TABLET ORAL at 20:47

## 2021-04-11 RX ADMIN — HALOPERIDOL DECANOATE 5 MILLIGRAM(S): 100 INJECTION INTRAMUSCULAR at 20:47

## 2021-04-11 RX ADMIN — Medication 2 MILLIGRAM(S): at 20:47

## 2021-04-11 RX ADMIN — RISPERIDONE 1 MILLIGRAM(S): 4 TABLET ORAL at 08:55

## 2021-04-11 RX ADMIN — Medication 50 MILLIGRAM(S): at 20:48

## 2021-04-11 RX ADMIN — Medication 1 MILLIGRAM(S): at 20:48

## 2021-04-11 RX ADMIN — Medication 50 MILLIGRAM(S): at 10:11

## 2021-04-11 RX ADMIN — Medication 2 MILLIGRAM(S): at 10:12

## 2021-04-11 RX ADMIN — HALOPERIDOL DECANOATE 5 MILLIGRAM(S): 100 INJECTION INTRAMUSCULAR at 10:11

## 2021-04-11 NOTE — BH INPATIENT PSYCHIATRY PROGRESS NOTE - NSBHASSESSSUMMFT_PSY_ALL_CORE
Pt is a 21 y/o AA male, single, noncaregiver, lives with parents and 2 siblings,  diagnoses of Bipolar Disorder with psychosis, history of cannabis and K2 abuse, 3 past psych hospitalizations, first in 2014 at [facility] for psychosis and most recently at [facility] from 10/19/19-11/5/19, no current outpatient provider, noncompliant with psychiatric treatment since [facility] discharge, no history of self-injurious behavior or suicide attempts, no h/o violence or legal issues, no PMH, +regular cannabis and K2 use, brought in by cousin for not sleeping, paranoia, grandiosity, and disorganized thought process in context of medication noncompliance and likely ongoing cannabis and k2 use.  Patient continues to have labile mood, and difficulty concentrating and focusing on tasks. He continues to require inpatient psychiatric hospitalization for his Bipolar disorder.

## 2021-04-11 NOTE — BH INPATIENT PSYCHIATRY PROGRESS NOTE - NSBHFUPINTERVALHXFT_PSY_A_CORE
Patient is followed up for Bipolar Disorder, admitted for psychosis in context of noncompliance with medications.  Chart, medications and labs reviewed.  Patient is discussed with nursing staff. No significant overnight issues. Patient is observed pacing hallway, and in dayroom.  He agrees to interview, he is cooperative but persist manic, hyperverbal disorganized, internally preoccupied, paranoid. Patient reports that prior to admission he got “mad about what I thought I heard, and I needed to protect my family.”  Patient would not elaborate on what he thought he heard only stating “I have a gift of hearing people’s thoughts.” He denies SI/HI, denies all psychotic features, despite psychotic symptoms successfully elicited.  At times mumbling to self, talking to self, laughing inappropriately to self during interview, +grandiose delusions. Improved sleep and appetite.   Remains compliant with medications on unit.  Per chart review patient has potential to be aggressive. Has not had any behavioral issues on unit requiring IM medications.  Continue to provide therapeutic support.

## 2021-04-12 PROCEDURE — 99232 SBSQ HOSP IP/OBS MODERATE 35: CPT

## 2021-04-12 RX ORDER — TRAZODONE HCL 50 MG
50 TABLET ORAL AT BEDTIME
Refills: 0 | Status: DISCONTINUED | OUTPATIENT
Start: 2021-04-12 | End: 2021-05-03

## 2021-04-12 RX ORDER — QUETIAPINE FUMARATE 200 MG/1
50 TABLET, FILM COATED ORAL AT BEDTIME
Refills: 0 | Status: DISCONTINUED | OUTPATIENT
Start: 2021-04-12 | End: 2021-04-12

## 2021-04-12 RX ORDER — DIVALPROEX SODIUM 500 MG/1
500 TABLET, DELAYED RELEASE ORAL
Refills: 0 | Status: DISCONTINUED | OUTPATIENT
Start: 2021-04-12 | End: 2021-04-13

## 2021-04-12 RX ORDER — RISPERIDONE 4 MG/1
2 TABLET ORAL
Refills: 0 | Status: DISCONTINUED | OUTPATIENT
Start: 2021-04-12 | End: 2021-04-13

## 2021-04-12 RX ORDER — HYDROXYZINE HCL 10 MG
25 TABLET ORAL EVERY 6 HOURS
Refills: 0 | Status: DISCONTINUED | OUTPATIENT
Start: 2021-04-12 | End: 2021-05-03

## 2021-04-12 RX ADMIN — RISPERIDONE 1 MILLIGRAM(S): 4 TABLET ORAL at 08:45

## 2021-04-12 RX ADMIN — Medication 2 MILLIGRAM(S): at 21:10

## 2021-04-12 RX ADMIN — DIVALPROEX SODIUM 500 MILLIGRAM(S): 500 TABLET, DELAYED RELEASE ORAL at 21:10

## 2021-04-12 RX ADMIN — Medication 50 MILLIGRAM(S): at 21:10

## 2021-04-12 RX ADMIN — RISPERIDONE 2 MILLIGRAM(S): 4 TABLET ORAL at 21:10

## 2021-04-12 RX ADMIN — Medication 1 MILLIGRAM(S): at 21:10

## 2021-04-12 RX ADMIN — Medication 1 MILLIGRAM(S): at 08:45

## 2021-04-12 NOTE — BH SOCIAL WORK INITIAL PSYCHOSOCIAL EVALUATION - NSBHEMPLOYEDYN_PSY_ALL_CORE
[No Acute Distress] : no acute distress [Well Nourished] : well nourished [Well Developed] : well developed [Normal Sclera/Conjunctiva] : normal sclera/conjunctiva [EOMI] : extraocular movements intact [Normal Outer Ear/Nose] : the outer ears and nose were normal in appearance [Normal Oropharynx] : the oropharynx was normal [No JVD] : no jugular venous distention [No Lymphadenopathy] : no lymphadenopathy [Supple] : supple [No Respiratory Distress] : no respiratory distress  [No Accessory Muscle Use] : no accessory muscle use [Clear to Auscultation] : lungs were clear to auscultation bilaterally [Normal Rate] : normal rate  [Regular Rhythm] : with a regular rhythm [Normal S1, S2] : normal S1 and S2 [No Murmur] : no murmur heard [Pedal Pulses Present] : the pedal pulses are present [No Edema] : there was no peripheral edema [No Extremity Clubbing/Cyanosis] : no extremity clubbing/cyanosis [Soft] : abdomen soft [Non-distended] : non-distended [Normal Bowel Sounds] : normal bowel sounds [Normal Posterior Cervical Nodes] : no posterior cervical lymphadenopathy No [Normal Anterior Cervical Nodes] : no anterior cervical lymphadenopathy [No CVA Tenderness] : no CVA  tenderness [No Spinal Tenderness] : no spinal tenderness [No Joint Swelling] : no joint swelling [Grossly Normal Strength/Tone] : grossly normal strength/tone [No Rash] : no rash [Coordination Grossly Intact] : coordination grossly intact [No Focal Deficits] : no focal deficits [Normal Gait] : normal gait [Normal Affect] : the affect was normal [Normal Insight/Judgement] : insight and judgment were intact

## 2021-04-12 NOTE — BH SOCIAL WORK INITIAL PSYCHOSOCIAL EVALUATION - OTHER PAST PSYCHIATRIC HISTORY (INCLUDE DETAILS REGARDING ONSET, COURSE OF ILLNESS, INPATIENT/OUTPATIENT TREATMENT)
Pt is a 19 y/o -American male, single, non-caregiver, lives with parents and 2 siblings, diagnosed wit Bipolar Disorder with psychosis, hx of cannabis and K2 abuse, 3 past psych hospitalizations, first in 2014 at [facility] for psychosis and most recently at [facility] from 10/19/19-11/5/19, no current outpatient provider, no history of self-injurious behavior or suicide attempts, no h/o violence or legal issues, no PMH, brought in by cousin for not sleeping, paranoia, grandiosity, and disorganized thought process in context of medication noncompliance and likely ongoing cannabis and k2 use.

## 2021-04-12 NOTE — BH SOCIAL WORK INITIAL PSYCHOSOCIAL EVALUATION - NSCMSPTSTRENGTHS_PSY_ALL_CORE
Expressive of emotions/Future/goal oriented/Humor/Intact family/Physically healthy/Positive attitude/Sense of Humor

## 2021-04-12 NOTE — BH INPATIENT PSYCHIATRY PROGRESS NOTE - NSBHFUPINTERVALHXFT_PSY_A_CORE
Patient is followed up for Bipolar Disorder, admitted for psychosis in context of noncompliance with medications.  Chart, medications and labs reviewed.  Patient is discussed with nursing staff. No significant overnight issues but was medicated with PRN medication for agitation last night. He continues to pace the hallways and appears internally preoccupied. The patient admitted to non-command auditory hallucinations. Denies VH, TH. During the follow-up, the patient presented with nando: hyperverbal, grandiosity, easily distracted, tangential, disorganized, paranoid, hypersexual,( "All I want to do is F***", drawing pornographic pictures). He denies SI/ HI, intent and plan. The patient was medication seeking for Adderall. He noted fair sleep and appetite. He is compliant with his medication regimen and denies side effects. Admits to alcohol use, once a week, daily marijuana use (unknown amount). He denies all other substances.

## 2021-04-12 NOTE — BH INPATIENT PSYCHIATRY PROGRESS NOTE - NSBHASSESSSUMMFT_PSY_ALL_CORE
Pt is a 19 y/o AA male, single, noncaregiver, lives with parents and 2 siblings,  diagnoses of Bipolar Disorder with psychosis, history of cannabis and K2 abuse, 3 past psych hospitalizations, first in 2014 at [facility] for psychosis and most recently at [facility] from 10/19/19-11/5/19, no current outpatient provider, noncompliant with psychiatric treatment since [facility] discharge, no history of self-injurious behavior or suicide attempts, no h/o violence or legal issues, no PMH, +regular cannabis and K2 use, brought in by cousin for not sleeping, paranoia, grandiosity, and disorganized thought process in context of medication noncompliance and likely ongoing cannabis and k2 use.  Patient continues to have labile mood, and difficulty concentrating and focusing on tasks. He continues to require inpatient psychiatric hospitalization for his Bipolar disorder.      Presents with nando: hyperverbal, grandiosity, easily distracted, tangential, disorganized, paranoid, hypersexual,( "All I want to do is F***", drawing pornographic pictures). He denies SI/ HI, intent and plan. The patient was medication seeking for Adderall. He noted fair sleep and appetite. He is compliant with his medication regimen and denies side effects. Remains at risk for aggression.     >Obs: Routine, no current SI. no need for CO, patient not expected to pose risk to self or others in controlled inpatient setting  >Psychiatric Meds: Risperdal increased to 2mg po daily, started Depakote ER 500mg po twice a day. Trazadone 50mg po at bedtime PRN.  >Medical:  No acute concerns  >Social: milieu therapy  >Treatment Interventions: Groups and Individual Therapy  >Dispo: Collateral and dispo planning pending further symptom and medication optimization.

## 2021-04-13 PROCEDURE — 99232 SBSQ HOSP IP/OBS MODERATE 35: CPT

## 2021-04-13 PROCEDURE — 90853 GROUP PSYCHOTHERAPY: CPT

## 2021-04-13 RX ORDER — RISPERIDONE 4 MG/1
3 TABLET ORAL AT BEDTIME
Refills: 0 | Status: DISCONTINUED | OUTPATIENT
Start: 2021-04-13 | End: 2021-04-15

## 2021-04-13 RX ORDER — RISPERIDONE 4 MG/1
1 TABLET ORAL DAILY
Refills: 0 | Status: DISCONTINUED | OUTPATIENT
Start: 2021-04-14 | End: 2021-04-16

## 2021-04-13 RX ORDER — DIVALPROEX SODIUM 500 MG/1
500 TABLET, DELAYED RELEASE ORAL
Refills: 0 | Status: DISCONTINUED | OUTPATIENT
Start: 2021-04-13 | End: 2021-05-03

## 2021-04-13 RX ORDER — DIVALPROEX SODIUM 500 MG/1
1000 TABLET, DELAYED RELEASE ORAL AT BEDTIME
Refills: 0 | Status: DISCONTINUED | OUTPATIENT
Start: 2021-04-13 | End: 2021-04-23

## 2021-04-13 RX ADMIN — Medication 1 MILLIGRAM(S): at 21:12

## 2021-04-13 RX ADMIN — RISPERIDONE 2 MILLIGRAM(S): 4 TABLET ORAL at 08:08

## 2021-04-13 RX ADMIN — DIVALPROEX SODIUM 1000 MILLIGRAM(S): 500 TABLET, DELAYED RELEASE ORAL at 21:16

## 2021-04-13 RX ADMIN — Medication 2 MILLIGRAM(S): at 21:14

## 2021-04-13 RX ADMIN — RISPERIDONE 3 MILLIGRAM(S): 4 TABLET ORAL at 21:16

## 2021-04-13 RX ADMIN — Medication 1 MILLIGRAM(S): at 08:08

## 2021-04-13 RX ADMIN — DIVALPROEX SODIUM 500 MILLIGRAM(S): 500 TABLET, DELAYED RELEASE ORAL at 08:08

## 2021-04-13 RX ADMIN — Medication 50 MILLIGRAM(S): at 21:13

## 2021-04-13 NOTE — BH PSYCHOLOGY - GROUP THERAPY NOTE - NSPSYCHOLGRPCOGPT_PSY_A_CORE FT
Patient attended Cognitive Behavioral Therapy Group. The group started with a brief check-in during which Pts were asked to share something they are grateful for this year. The group then focused on the topic of the connection between our physical and mental health and reviewed the importance of getting balanced sleep, eating a balanced diet, and exercising. The group also discussed ways to engage in mindful positive experiences as well as engaging in activities that allow for mastery and build a sense of accomplishment.  explained concepts, reinforced participation, and engaged patients in the discussion.

## 2021-04-13 NOTE — BH PSYCHOLOGY - GROUP THERAPY NOTE - NSBHPSYCHOLRESPCOMMENT_PSY_A_CORE FT
Pt appeared adequately groomed and casually dressed. Pt appeared fairly engaged in group as evidenced by his willingness to attend to the group discussion. During check-in, Pt expressed that he was grateful for his friends telling him about unemployment, particularly because of the pandemic. Pt was quiet for much of group discussion, but listened attentively throughout and agreed with what other members shared. Pt also asked facilitators about how to get a notebook to write in, adding that this would be beneficial for him. Pt agreed to read from the worksheet. Speech was pressured at times, but WNL overall. PT was oriented X3. PT was appropriate with others.

## 2021-04-13 NOTE — BH INPATIENT PSYCHIATRY PROGRESS NOTE - NSBHASSESSSUMMFT_PSY_ALL_CORE
Pt is a 19 y/o AA male, single, noncaregiver, lives with parents and 2 siblings,  diagnoses of Bipolar Disorder with psychosis, history of cannabis and K2 abuse, 3 past psych hospitalizations, first in 2014 at [facility] for psychosis and most recently at [facility] from 10/19/19-11/5/19, no current outpatient provider, noncompliant with psychiatric treatment since [facility] discharge, no history of self-injurious behavior or suicide attempts, no h/o violence or legal issues, no PMH, +regular cannabis and K2 use, brought in by cousin for not sleeping, paranoia, grandiosity, and disorganized thought process in context of medication noncompliance and likely ongoing cannabis and k2 use.  Patient continues to have labile mood, and difficulty concentrating and focusing on tasks. He continues to require inpatient psychiatric hospitalization for his Bipolar disorder.      Improving manic symptoms: hyperverbal, grandiosity, easily distracted, tangential, disorganized, paranoid, hypersexual, hyperactive, drawing pornographic pictures). He denies SI/ HI, intent and plan. Admits to non-command AH which is improving.  Sleep and appetite are good. He is compliant with his medication regimen and denies side effects. Remains at risk for aggression.     >Obs: Routine, no current SI. no need for CO, patient not expected to pose risk to self or others in controlled inpatient setting  >Psychiatric Meds: Risperdal adjusted to 1mg po daily and 3mg po at bedtime, Depakote ER increased to 500mg po daily and 1000mg po at bedtime. Trazadone 50mg po at bedtime PRN.  >Medical:  No acute concerns  >Social: milieu therapy  >Treatment Interventions: Groups and Individual Therapy  >Dispo: Collateral and dispo planning pending further symptom and medication optimization.

## 2021-04-13 NOTE — BH INPATIENT PSYCHIATRY PROGRESS NOTE - NSBHFUPINTERVALHXFT_PSY_A_CORE
Patient is followed up for Bipolar Disorder, admitted for psychosis in context of noncompliance with medications.  Chart, medications and labs reviewed.  Patient was discussed with nursing staff. No significant overnight issues but was medicated with PRN medication for agitation last night. Denies VH, TH. The patient presents with some improvement: Less hyperverbal, less grandiosity, easily distracted, less tangential, less disorganized, some paranoid, less hypersexual. He denies SI/ HI, intent and plan. Admits to non-command  which is improving. Slept well last night and his appetite is good. He is compliant with his medication regimen and denies side effects. Group therapy encouraged.    Attempted to contact patient's mother Amairani at [phone]. No answer and left a voicemail.

## 2021-04-14 PROCEDURE — 99232 SBSQ HOSP IP/OBS MODERATE 35: CPT

## 2021-04-14 RX ADMIN — RISPERIDONE 3 MILLIGRAM(S): 4 TABLET ORAL at 21:22

## 2021-04-14 RX ADMIN — Medication 1 MILLIGRAM(S): at 09:16

## 2021-04-14 RX ADMIN — Medication 1 MILLIGRAM(S): at 21:23

## 2021-04-14 RX ADMIN — DIVALPROEX SODIUM 1000 MILLIGRAM(S): 500 TABLET, DELAYED RELEASE ORAL at 21:23

## 2021-04-14 RX ADMIN — DIVALPROEX SODIUM 500 MILLIGRAM(S): 500 TABLET, DELAYED RELEASE ORAL at 09:16

## 2021-04-14 RX ADMIN — RISPERIDONE 1 MILLIGRAM(S): 4 TABLET ORAL at 09:16

## 2021-04-14 NOTE — BH INPATIENT PSYCHIATRY PROGRESS NOTE - NSBHASSESSSUMMFT_PSY_ALL_CORE
Pt is a 21 y/o AA male, single, noncaregiver, lives with parents and 2 siblings,  diagnoses of Bipolar Disorder with psychosis, history of cannabis and K2 abuse, 3 past psych hospitalizations, first in 2014 at [facility] for psychosis and most recently at [facility] from 10/19/19-11/5/19, no current outpatient provider, noncompliant with psychiatric treatment since [facility] discharge, no history of self-injurious behavior or suicide attempts, no h/o violence or legal issues, no PMH, +regular cannabis and K2 use, brought in by cousin for not sleeping, paranoia, grandiosity, and disorganized thought process in context of medication noncompliance and likely ongoing cannabis and k2 use.  Patient continues to have labile mood, and difficulty concentrating and focusing on tasks. He continues to require inpatient psychiatric hospitalization for his Bipolar disorder.      Improving manic symptoms: hyperverbal, grandiosity, easily distracted, tangential, disorganized, paranoid, hypersexual, hyperactive, drawing pornographic pictures). He denies SI/ HI, intent and plan. Admits to non-command AH which is improving. Notable responding to internal stimuli by talking to self when alone. Sleep and appetite are good. He is compliant with his medication regimen and denies side effects. Remains at risk for aggression.     >Obs: Routine, no current SI. no need for CO, patient not expected to pose risk to self or others in controlled inpatient setting  >Psychiatric Meds: Risperdal adjusted to 1mg po daily and 3mg po at bedtime, Depakote ER increased to 500mg po daily and 1000mg po at bedtime. Trazadone 50mg po at bedtime PRN.  >Lab: Valproate acid level ordered for 4/16.  >Medical:  No acute concerns  >Social: milieu therapy  >Treatment Interventions: Groups and Individual Therapy  >Dispo: Collateral and dispo planning pending further symptom and medication optimization.

## 2021-04-14 NOTE — BH INPATIENT PSYCHIATRY PROGRESS NOTE - NSBHFUPINTERVALHXFT_PSY_A_CORE
Patient is followed up for bipolar Disorder, admitted for psychosis in context of noncompliance with medications.  Chart, medications and labs reviewed.  Patient was discussed with nursing staff. No significant overnight issues. He is visible on the unit and attends group therapy regularly. During the follow-up, the patient was more attentive and engaged. He admits to non-command AH and denies VH, TH, "the voices are being nice to me now", "they saying good things". He is notable responding to internal stimuli by talking to self when alone. The patient presents with some improvement: Less hyperverbal, less grandiosity, easily distracted, less tangential, less disorganized, some paranoid, less hypersexual. He denies SI/ HI, intent and plan. Slept well last night and his appetite is good. He is compliant with his medication regimen and denies side effects. Group therapy encouraged.

## 2021-04-15 PROCEDURE — 99232 SBSQ HOSP IP/OBS MODERATE 35: CPT | Mod: 25

## 2021-04-15 RX ORDER — RISPERIDONE 4 MG/1
4 TABLET ORAL AT BEDTIME
Refills: 0 | Status: DISCONTINUED | OUTPATIENT
Start: 2021-04-15 | End: 2021-05-03

## 2021-04-15 RX ORDER — IBUPROFEN 200 MG
400 TABLET ORAL EVERY 6 HOURS
Refills: 0 | Status: DISCONTINUED | OUTPATIENT
Start: 2021-04-15 | End: 2021-05-03

## 2021-04-15 RX ADMIN — DIVALPROEX SODIUM 500 MILLIGRAM(S): 500 TABLET, DELAYED RELEASE ORAL at 08:58

## 2021-04-15 RX ADMIN — Medication 1 MILLIGRAM(S): at 08:59

## 2021-04-15 RX ADMIN — Medication 50 MILLIGRAM(S): at 20:34

## 2021-04-15 RX ADMIN — RISPERIDONE 4 MILLIGRAM(S): 4 TABLET ORAL at 20:34

## 2021-04-15 RX ADMIN — DIVALPROEX SODIUM 1000 MILLIGRAM(S): 500 TABLET, DELAYED RELEASE ORAL at 20:34

## 2021-04-15 RX ADMIN — Medication 2 MILLIGRAM(S): at 20:34

## 2021-04-15 RX ADMIN — Medication 1 MILLIGRAM(S): at 20:34

## 2021-04-15 RX ADMIN — Medication 400 MILLIGRAM(S): at 13:53

## 2021-04-15 RX ADMIN — RISPERIDONE 1 MILLIGRAM(S): 4 TABLET ORAL at 08:58

## 2021-04-15 NOTE — BH INPATIENT PSYCHIATRY PROGRESS NOTE - NSBHASSESSSUMMFT_PSY_ALL_CORE
Pt is a 19 y/o AA male, single, noncaregiver, lives with parents and 2 siblings,  diagnoses of Bipolar Disorder with psychosis, history of cannabis and K2 abuse, 3 past psych hospitalizations, first in 2014 at [facility] for psychosis and most recently at [facility] from 10/19/19-11/5/19, no current outpatient provider, noncompliant with psychiatric treatment since [facility] discharge, no history of self-injurious behavior or suicide attempts, no h/o violence or legal issues, no PMH, +regular cannabis and K2 use, brought in by cousin for not sleeping, paranoia, grandiosity, and disorganized thought process in context of medication noncompliance and likely ongoing cannabis and k2 use.  Patient continues to have labile mood, and difficulty concentrating and focusing on tasks. He continues to require inpatient psychiatric hospitalization for his Bipolar disorder.      manic symptoms: hyperverbal, grandiosity, easily distracted, tangential, disorganized, paranoid, hypersexual, hyperactive, drawing pornographic pictures). He denies SI/ HI, intent and plan. Admits to non-command AH which is improving. Notable responding to internal stimuli by talking to self when alone. Sleep and appetite are good. He is compliant with his medication regimen and denies side effects. Remains at risk for aggression. C/o hand pain after punching a wall prior to his hospitalization. Motrin 400mg po PRN ordered and cool compress.     >Obs: Routine, no current SI. no need for CO, patient not expected to pose risk to self or others in controlled inpatient setting  >Psychiatric Meds: Risperdal increased to 1mg po daily and 4mg po at bedtime, Depakote ER increased to 500mg po daily and 1000mg po at bedtime. Trazadone 50mg po at bedtime PRN.  >Lab: Valproate acid level ordered for 4/16.  >Medical:  No acute concerns  >Social: milieu therapy  >Treatment Interventions: Groups and Individual Therapy  >Dispo: Collateral and dispo planning pending further symptom and medication optimization.

## 2021-04-15 NOTE — BH INPATIENT PSYCHIATRY PROGRESS NOTE - NSBHFUPINTERVALHXFT_PSY_A_CORE
Patient is followed up for bipolar Disorder, admitted for psychosis in context of noncompliance with medications.  Chart, medications and labs reviewed.  Patient was discussed with nursing staff. No significant overnight issues. He continues to be visible on the unit and attends group therapy regularly. Notably responding to internal stimuli when alone. During the follow-up, the patient was superficially engaged and often tangential. He admits to non-command AH which is becoming less and denies VH, TH.  He denies SI/ HI, intent and plan. Slept well last night and his appetite is good. As per primary RN, the patient stated prior to his admission, he punched a wall and is experiencing hand pain. He is compliant with his medication regimen and denies side effects. Group therapy encouraged.

## 2021-04-16 PROCEDURE — 99232 SBSQ HOSP IP/OBS MODERATE 35: CPT

## 2021-04-16 RX ORDER — ACETAMINOPHEN 500 MG
650 TABLET ORAL ONCE
Refills: 0 | Status: COMPLETED | OUTPATIENT
Start: 2021-04-16 | End: 2021-04-16

## 2021-04-16 RX ORDER — RISPERIDONE 4 MG/1
2 TABLET ORAL DAILY
Refills: 0 | Status: DISCONTINUED | OUTPATIENT
Start: 2021-04-17 | End: 2021-05-03

## 2021-04-16 RX ADMIN — Medication 400 MILLIGRAM(S): at 09:53

## 2021-04-16 RX ADMIN — Medication 650 MILLIGRAM(S): at 02:01

## 2021-04-16 RX ADMIN — RISPERIDONE 1 MILLIGRAM(S): 4 TABLET ORAL at 09:44

## 2021-04-16 RX ADMIN — Medication 50 MILLIGRAM(S): at 21:05

## 2021-04-16 RX ADMIN — DIVALPROEX SODIUM 500 MILLIGRAM(S): 500 TABLET, DELAYED RELEASE ORAL at 09:45

## 2021-04-16 RX ADMIN — RISPERIDONE 4 MILLIGRAM(S): 4 TABLET ORAL at 21:05

## 2021-04-16 RX ADMIN — Medication 1 MILLIGRAM(S): at 21:05

## 2021-04-16 RX ADMIN — Medication 1 MILLIGRAM(S): at 09:45

## 2021-04-16 RX ADMIN — DIVALPROEX SODIUM 1000 MILLIGRAM(S): 500 TABLET, DELAYED RELEASE ORAL at 21:04

## 2021-04-16 RX ADMIN — Medication 650 MILLIGRAM(S): at 00:38

## 2021-04-16 NOTE — BH INPATIENT PSYCHIATRY PROGRESS NOTE - NSBHASSESSSUMMFT_PSY_ALL_CORE
Pt is a 21 y/o AA male, single, noncaregiver, lives with parents and 2 siblings,  diagnoses of Bipolar Disorder with psychosis, history of cannabis and K2 abuse, 3 past psych hospitalizations, first in 2014 at [facility] for psychosis and most recently at [facility] from 10/19/19-11/5/19, no current outpatient provider, noncompliant with psychiatric treatment since [facility] discharge, no history of self-injurious behavior or suicide attempts, no h/o violence or legal issues, no PMH, +regular cannabis and K2 use, brought in by cousin for not sleeping, paranoia, grandiosity, and disorganized thought process in context of medication noncompliance and likely ongoing cannabis and k2 use.  Patient continues to have labile mood, and difficulty concentrating and focusing on tasks. He continues to require inpatient psychiatric hospitalization for his Bipolar disorder.      Manic symptoms: hyperverbal, grandiosity, easily distracted, tangential, disorganized, paranoid, hypersexual, hyperactive, drawing pornographic pictures). Has poor insight into his illness. He denies SI/ HI, intent and plan. Admits to non-command AH which is improving. Notable responding to internal stimuli by talking to self when alone. Sleep and appetite are good. He is compliant with his medication regimen and denies side effects. Remains at risk for aggression.     >Obs: Routine, no current SI. no need for CO, patient not expected to pose risk to self or others in controlled inpatient setting  >Psychiatric Meds: Risperdal increased to 2mg po daily and 4mg po at bedtime, Depakote ER increased to 500mg po daily and 1000mg po at bedtime. Trazadone 50mg po at bedtime PRN.  >Lab: Valproate acid level ordered for 4/16. (Pt refused).  >Medical:  No acute concerns  >Social: milieu therapy  >Treatment Interventions: Groups and Individual Therapy  >Dispo: Collateral and dispo planning pending further symptom and medication optimization.

## 2021-04-16 NOTE — BH INPATIENT PSYCHIATRY PROGRESS NOTE - NSBHFUPINTERVALHXFT_PSY_A_CORE
Patient is followed up for bipolar Disorder, admitted for psychosis in context of noncompliance with medications.  Chart, medications and labs reviewed.  Patient was discussed with nursing staff. No significant overnight issues. He continues to be visible on the unit, cooperative with treatment. He is responding to internal stimuli, talking to himself often. He presents tangential, thought blocking, at times disorganized, bizarre and sexually preoccupied. He admits to improving non-command AH and denies VH, TH.  He denies SI/ HI, intent and plan. Slept well last night and his appetite is good. He is compliant with his medication regimen and denies side effects. Group therapy encouraged.

## 2021-04-17 RX ADMIN — RISPERIDONE 4 MILLIGRAM(S): 4 TABLET ORAL at 22:07

## 2021-04-17 RX ADMIN — Medication 1 MILLIGRAM(S): at 21:30

## 2021-04-17 RX ADMIN — Medication 1 MILLIGRAM(S): at 08:49

## 2021-04-17 RX ADMIN — RISPERIDONE 2 MILLIGRAM(S): 4 TABLET ORAL at 08:49

## 2021-04-17 RX ADMIN — HALOPERIDOL DECANOATE 5 MILLIGRAM(S): 100 INJECTION INTRAMUSCULAR at 21:30

## 2021-04-17 RX ADMIN — Medication 50 MILLIGRAM(S): at 09:26

## 2021-04-17 RX ADMIN — Medication 2 MILLIGRAM(S): at 09:27

## 2021-04-17 RX ADMIN — DIVALPROEX SODIUM 1000 MILLIGRAM(S): 500 TABLET, DELAYED RELEASE ORAL at 21:30

## 2021-04-17 RX ADMIN — Medication 50 MILLIGRAM(S): at 21:31

## 2021-04-17 RX ADMIN — Medication 2 MILLIGRAM(S): at 21:30

## 2021-04-17 RX ADMIN — Medication 400 MILLIGRAM(S): at 06:13

## 2021-04-17 RX ADMIN — HALOPERIDOL DECANOATE 5 MILLIGRAM(S): 100 INJECTION INTRAMUSCULAR at 09:26

## 2021-04-17 RX ADMIN — DIVALPROEX SODIUM 500 MILLIGRAM(S): 500 TABLET, DELAYED RELEASE ORAL at 08:49

## 2021-04-18 RX ADMIN — DIVALPROEX SODIUM 500 MILLIGRAM(S): 500 TABLET, DELAYED RELEASE ORAL at 08:56

## 2021-04-18 RX ADMIN — Medication 1 MILLIGRAM(S): at 21:37

## 2021-04-18 RX ADMIN — RISPERIDONE 4 MILLIGRAM(S): 4 TABLET ORAL at 21:56

## 2021-04-18 RX ADMIN — DIVALPROEX SODIUM 1000 MILLIGRAM(S): 500 TABLET, DELAYED RELEASE ORAL at 21:37

## 2021-04-18 RX ADMIN — Medication 1 MILLIGRAM(S): at 08:56

## 2021-04-18 RX ADMIN — HALOPERIDOL DECANOATE 5 MILLIGRAM(S): 100 INJECTION INTRAMUSCULAR at 08:59

## 2021-04-18 RX ADMIN — Medication 50 MILLIGRAM(S): at 08:59

## 2021-04-18 RX ADMIN — Medication 2 MILLIGRAM(S): at 08:59

## 2021-04-18 RX ADMIN — RISPERIDONE 2 MILLIGRAM(S): 4 TABLET ORAL at 08:56

## 2021-04-19 PROCEDURE — 99232 SBSQ HOSP IP/OBS MODERATE 35: CPT

## 2021-04-19 RX ORDER — CLONAZEPAM 1 MG
0.5 TABLET ORAL
Refills: 0 | Status: DISCONTINUED | OUTPATIENT
Start: 2021-04-19 | End: 2021-04-20

## 2021-04-19 RX ADMIN — DIVALPROEX SODIUM 500 MILLIGRAM(S): 500 TABLET, DELAYED RELEASE ORAL at 09:09

## 2021-04-19 RX ADMIN — Medication 1 MILLIGRAM(S): at 09:08

## 2021-04-19 RX ADMIN — HALOPERIDOL DECANOATE 5 MILLIGRAM(S): 100 INJECTION INTRAMUSCULAR at 13:08

## 2021-04-19 RX ADMIN — Medication 1 MILLIGRAM(S): at 21:18

## 2021-04-19 RX ADMIN — Medication 50 MILLIGRAM(S): at 13:08

## 2021-04-19 RX ADMIN — Medication 2 MILLIGRAM(S): at 13:08

## 2021-04-19 RX ADMIN — RISPERIDONE 4 MILLIGRAM(S): 4 TABLET ORAL at 21:18

## 2021-04-19 RX ADMIN — Medication 0.5 MILLIGRAM(S): at 21:18

## 2021-04-19 RX ADMIN — RISPERIDONE 2 MILLIGRAM(S): 4 TABLET ORAL at 09:08

## 2021-04-19 RX ADMIN — DIVALPROEX SODIUM 1000 MILLIGRAM(S): 500 TABLET, DELAYED RELEASE ORAL at 21:18

## 2021-04-19 NOTE — BH INPATIENT PSYCHIATRY PROGRESS NOTE - NSBHASSESSSUMMFT_PSY_ALL_CORE
Pt is a 19 y/o AA male, single, noncaregiver, lives with parents and 2 siblings,  diagnoses of Bipolar Disorder with psychosis, history of cannabis and K2 abuse, 3 past psych hospitalizations, first in 2014 at [facility] for psychosis and most recently at [facility] from 10/19/19-11/5/19, no current outpatient provider, noncompliant with psychiatric treatment since [facility] discharge, no history of self-injurious behavior or suicide attempts, no h/o violence or legal issues, no PMH, +regular cannabis and K2 use, brought in by cousin for not sleeping, paranoia, grandiosity, and disorganized thought process in context of medication noncompliance and likely ongoing cannabis and k2 use.  Patient continues to have labile mood, and difficulty concentrating and focusing on tasks. He continues to require inpatient psychiatric hospitalization for his Bipolar disorder.      Manic symptoms: hyperverbal, grandiosity, easily distracted, tangential, disorganized, paranoid, hypersexual, hyperactive, drawing pornographic pictures). Has poor insight into his illness. He denies SI/ HI, intent and plan. Admits to non-command AH which is improving. Notable responding to internal stimuli by talking to self when alone. Sleep and appetite are good. He is compliant with his medication regimen and denies side effects. Remains at risk for aggression.     >Obs: Routine, no current SI. no need for CO, patient not expected to pose risk to self or others in controlled inpatient setting  >Psychiatric Meds: Clonazepam 0.5mg po twice a day ordered for nando. Risperdal increased to 2mg po daily and 4mg po at bedtime, Depakote ER increased to 500mg po daily and 1000mg po at bedtime. Trazadone 50mg po at bedtime PRN.  >Lab: Valproate acid level ordered for 4/16 and hr refused bloodwork, reordered for 4/20 am.  >Medical:  No acute concerns  >Social: milieu therapy  >Treatment Interventions: Groups and Individual Therapy  >Dispo: Collateral and dispo planning pending further symptom and medication optimization.

## 2021-04-19 NOTE — BH INPATIENT PSYCHIATRY PROGRESS NOTE - NSBHFUPINTERVALHXFT_PSY_A_CORE
Patient is followed up for bipolar Disorder, admitted for psychosis in context of noncompliance with medications and substance use.  Chart, medications and labs reviewed.  Patient was discussed with nursing staff. Patient was reported with agitation and received PRN medication (see EMAR). He is visible on the unit and presents with hyperactivity, hyperverbal, irritability with periods of agitation, internally preoccupied and responding to internal stimuli. He presents tangential, thought blocking, at times disorganized, bizarre and sexually preoccupied. He admits to improving non-command AH and denies VH, TH.  He denies SI/ HI, intent and plan. Slept well last night and his appetite is good. He is compliant with his medication regimen and denies side effects. Group therapy, coping skills and fresh air breaks encouraged.

## 2021-04-20 LAB — VALPROATE SERPL-MCNC: 59.5 UG/ML — SIGNIFICANT CHANGE UP (ref 50–100)

## 2021-04-20 PROCEDURE — 99232 SBSQ HOSP IP/OBS MODERATE 35: CPT

## 2021-04-20 RX ORDER — CLONAZEPAM 1 MG
1 TABLET ORAL
Refills: 0 | Status: DISCONTINUED | OUTPATIENT
Start: 2021-04-20 | End: 2021-04-26

## 2021-04-20 RX ADMIN — Medication 50 MILLIGRAM(S): at 10:55

## 2021-04-20 RX ADMIN — DIVALPROEX SODIUM 1000 MILLIGRAM(S): 500 TABLET, DELAYED RELEASE ORAL at 20:07

## 2021-04-20 RX ADMIN — Medication 1 MILLIGRAM(S): at 09:05

## 2021-04-20 RX ADMIN — RISPERIDONE 4 MILLIGRAM(S): 4 TABLET ORAL at 20:07

## 2021-04-20 RX ADMIN — HALOPERIDOL DECANOATE 5 MILLIGRAM(S): 100 INJECTION INTRAMUSCULAR at 10:55

## 2021-04-20 RX ADMIN — Medication 0.5 MILLIGRAM(S): at 09:06

## 2021-04-20 RX ADMIN — Medication 25 MILLIGRAM(S): at 10:55

## 2021-04-20 RX ADMIN — DIVALPROEX SODIUM 500 MILLIGRAM(S): 500 TABLET, DELAYED RELEASE ORAL at 09:06

## 2021-04-20 RX ADMIN — Medication 1 MILLIGRAM(S): at 20:08

## 2021-04-20 RX ADMIN — Medication 50 MILLIGRAM(S): at 20:07

## 2021-04-20 RX ADMIN — Medication 25 MILLIGRAM(S): at 20:08

## 2021-04-20 RX ADMIN — RISPERIDONE 2 MILLIGRAM(S): 4 TABLET ORAL at 09:06

## 2021-04-20 NOTE — BH INPATIENT PSYCHIATRY PROGRESS NOTE - NSBHFUPINTERVALHXFT_PSY_A_CORE
Patient is followed up for bipolar Disorder, admitted for psychosis in context of noncompliance with medications and substance use.  Chart, medications and labs reviewed.  Patient was discussed with nursing staff. He is visible on the unit and attended group therapy. Patient was reported with agitation in the morning and received PRN medication (see EMAR). Noted some improvement with hyperactivity, hyperverbal, irritability with periods of agitation, internally preoccupied and responding to internal stimuli. Noted he had periods of disorganized thoughts and thought blocking during the follow-up. He admits to improving non-command AH (unable to describe further) and denies VH, TH.  He denies SI/ HI, intent and plan. Slept well last night and his appetite is good. He is compliant with his medication regimen and denies side effects. Group therapy, coping skills and fresh air breaks encouraged.

## 2021-04-20 NOTE — BH INPATIENT PSYCHIATRY PROGRESS NOTE - NSBHASSESSSUMMFT_PSY_ALL_CORE
Pt is a 21 y/o AA male, single, noncaregiver, lives with parents and 2 siblings,  diagnoses of Bipolar Disorder with psychosis, history of cannabis and K2 abuse, 3 past psych hospitalizations, first in 2014 at [facility] for psychosis and most recently at [facility] from 10/19/19-11/5/19, no current outpatient provider, noncompliant with psychiatric treatment since [facility] discharge, no history of self-injurious behavior or suicide attempts, no h/o violence or legal issues, no PMH, +regular cannabis and K2 use, brought in by cousin for not sleeping, paranoia, grandiosity, and disorganized thought process in context of medication noncompliance and likely ongoing cannabis and k2 use.  Patient continues to have labile mood, and difficulty concentrating and focusing on tasks. He continues to require inpatient psychiatric hospitalization for his Bipolar disorder.      Manic symptoms: hyperverbal, grandiosity, easily distracted, tangential, disorganized, paranoid, hypersexual, hyperactive, drawing pornographic pictures). Has poor insight into his illness. He denies SI/ HI, intent and plan. Admits to non-command AH which is improving. Notable responding to internal stimuli by talking to self when alone. Sleep and appetite are good. He is compliant with his medication regimen and denies side effects. Remains at risk for aggression.     >Obs: Routine, no current SI. no need for CO, patient not expected to pose risk to self or others in controlled inpatient setting  >Psychiatric Meds: Clonazepam increased to 1mg po twice a day ordered for nando. Risperdal 2mg po daily and 4mg po at bedtime, Depakote ER increased to 500mg po daily and 1000mg po at bedtime. Trazadone 50mg po at bedtime PRN.  >Lab: Valproate acid level is 59.5.  >Medical:  No acute concerns  >Social: milieu therapy  >Treatment Interventions: Groups and Individual Therapy  >Dispo: Collateral and dispo planning pending further symptom and medication optimization.

## 2021-04-21 PROCEDURE — 99232 SBSQ HOSP IP/OBS MODERATE 35: CPT

## 2021-04-21 RX ADMIN — DIVALPROEX SODIUM 500 MILLIGRAM(S): 500 TABLET, DELAYED RELEASE ORAL at 08:51

## 2021-04-21 RX ADMIN — RISPERIDONE 4 MILLIGRAM(S): 4 TABLET ORAL at 20:59

## 2021-04-21 RX ADMIN — Medication 1 MILLIGRAM(S): at 08:51

## 2021-04-21 RX ADMIN — Medication 1 MILLIGRAM(S): at 20:59

## 2021-04-21 RX ADMIN — RISPERIDONE 2 MILLIGRAM(S): 4 TABLET ORAL at 08:51

## 2021-04-21 RX ADMIN — DIVALPROEX SODIUM 1000 MILLIGRAM(S): 500 TABLET, DELAYED RELEASE ORAL at 20:58

## 2021-04-21 NOTE — BH INPATIENT PSYCHIATRY PROGRESS NOTE - NSBHASSESSSUMMFT_PSY_ALL_CORE
Pt is a 19 y/o AA male, single, noncaregiver, lives with parents and 2 siblings,  diagnoses of Bipolar Disorder with psychosis, history of cannabis and K2 abuse, 3 past psych hospitalizations, first in 2014 at [facility] for psychosis and most recently at [facility] from 10/19/19-11/5/19, no current outpatient provider, noncompliant with psychiatric treatment since [facility] discharge, no history of self-injurious behavior or suicide attempts, no h/o violence or legal issues, no PMH, +regular cannabis and K2 use, brought in by cousin for not sleeping, paranoia, grandiosity, and disorganized thought process in context of medication noncompliance and likely ongoing cannabis and k2 use.  Patient continues to have labile mood, and difficulty concentrating and focusing on tasks. He continues to require inpatient psychiatric hospitalization for his Bipolar disorder.      Manic symptoms: hyperverbal, grandiosity, easily distracted, tangential, disorganized, paranoid, hypersexual, hyperactive, drawing pornographic pictures). Has poor insight into his illness. He denies SI/ HI, intent and plan. Admits to non-command AH which is improving. Notable responding to internal stimuli by talking to self when alone. Sleep and appetite are good. He is compliant with his medication regimen and denies side effects. Remains at risk for aggression.     >Obs: Routine, no current SI. no need for CO, patient not expected to pose risk to self or others in controlled inpatient setting  >Psychiatric Meds: Clonazepam increased to 1mg po twice a day ordered for nando. Risperdal 2mg po daily and 4mg po at bedtime, Depakote ER increased to 500mg po daily and 1000mg po at bedtime. Trazadone 50mg po at bedtime PRN.  >Lab: Valproate acid level is 59.5.  >Medical:  No acute concerns  >Social: milieu therapy  >Treatment Interventions: Groups and Individual Therapy  >Dispo: Collateral and dispo planning pending further symptom and medication optimization.

## 2021-04-21 NOTE — BH INPATIENT PSYCHIATRY PROGRESS NOTE - OTHER
Bizarre, odd at times. seen talking to self, appears internally preoccupied Improving thought process

## 2021-04-21 NOTE — BH INPATIENT PSYCHIATRY PROGRESS NOTE - NSBHFUPINTERVALHXFT_PSY_A_CORE
Patient is followed up for bipolar Disorder, admitted for psychosis in context of noncompliance with medications and substance use.  Chart, medications and labs reviewed.  Patient was discussed with nursing staff. Noted he was sleeping most of the day today. During follow-up, the patient appears calm, less irritable and able to concentrate. He reported non-command AH which improved moderately compared to yesterday. He denies VH, TH.  He denies SI/ HI, intent and plan. Slept well last night and his appetite is good. He is compliant with his medication regimen and denies side effects. Group therapy, coping skills and fresh air breaks encouraged.

## 2021-04-22 PROCEDURE — 99223 1ST HOSP IP/OBS HIGH 75: CPT

## 2021-04-22 PROCEDURE — 99232 SBSQ HOSP IP/OBS MODERATE 35: CPT

## 2021-04-22 RX ADMIN — RISPERIDONE 4 MILLIGRAM(S): 4 TABLET ORAL at 21:21

## 2021-04-22 RX ADMIN — DIVALPROEX SODIUM 1000 MILLIGRAM(S): 500 TABLET, DELAYED RELEASE ORAL at 21:21

## 2021-04-22 RX ADMIN — Medication 50 MILLIGRAM(S): at 21:21

## 2021-04-22 RX ADMIN — DIVALPROEX SODIUM 500 MILLIGRAM(S): 500 TABLET, DELAYED RELEASE ORAL at 08:59

## 2021-04-22 RX ADMIN — Medication 1 MILLIGRAM(S): at 08:59

## 2021-04-22 RX ADMIN — Medication 1 MILLIGRAM(S): at 21:21

## 2021-04-22 RX ADMIN — Medication 1 MILLIGRAM(S): at 21:23

## 2021-04-22 RX ADMIN — RISPERIDONE 2 MILLIGRAM(S): 4 TABLET ORAL at 08:59

## 2021-04-22 RX ADMIN — Medication 400 MILLIGRAM(S): at 08:59

## 2021-04-22 NOTE — CONSULT NOTE ADULT - SUBJECTIVE AND OBJECTIVE BOX
HPI:   20M with no PMHx, PPH of bipolar, at [facility] for psychosis. patient was walking out of shower, when he slipped and fell onto his left side against the door. He denied LOC or dizziness. attributed to slipper floor. He is complaining of mild pain over his left medial hand along is 5th finger, which he used to break the fall. he is able to move and use his hand.     PAST MEDICAL & SURGICAL HISTORY:  ADHD (attention deficit hyperactivity disorder)    Schizophrenia, unspecified type    No significant past surgical history        Review of Systems:   CONSTITUTIONAL: No fever, weight loss, or fatigue  EYES: No eye pain, visual disturbances, or discharge  ENMT:  No difficulty hearing, tinnitus, vertigo; No sinus or throat pain  NECK: No pain or stiffness  RESPIRATORY: No cough, wheezing, chills or hemoptysis; No shortness of breath  CARDIOVASCULAR: No chest pain, palpitations, dizziness, or leg swelling  GASTROINTESTINAL: No abdominal or epigastric pain. No nausea, vomiting, or hematemesis; No diarrhea or constipation. No melena or hematochezia.  GENITOURINARY: No dysuria, frequency, hematuria, or incontinence  NEUROLOGICAL: No headaches, memory loss, loss of strength, numbness, or tremors  SKIN: No itching, burning, rashes, or lesions   LYMPH NODES: No enlarged glands  ENDOCRINE: No heat or cold intolerance; No hair loss  MUSCULOSKELETAL: left hand swelling pain; No muscle, back, or extremity pain  HEME/LYMPH: No easy bruising, or bleeding gums  ALLERY AND IMMUNOLOGIC: No hives or eczema    Allergies    No Known Allergies    Intolerances        Social History:     FAMILY HISTORY:  No pertinent family history in first degree relatives        MEDICATIONS  (STANDING):  benztropine 1 milliGRAM(s) Oral two times a day  clonazePAM  Tablet 1 milliGRAM(s) Oral two times a day  diVALproex  milliGRAM(s) Oral <User Schedule>  diVALproex ER 1000 milliGRAM(s) Oral at bedtime  risperiDONE  Disintegrating Tablet 4 milliGRAM(s) Oral at bedtime  risperiDONE  Disintegrating Tablet 2 milliGRAM(s) Oral daily    MEDICATIONS  (PRN):  diphenhydrAMINE 50 milliGRAM(s) Oral every 6 hours PRN eps ppx/agitation  diphenhydrAMINE   Injectable 50 milliGRAM(s) IntraMuscular once PRN eps ppx/severe agitation  haloperidol     Tablet 5 milliGRAM(s) Oral every 6 hours PRN agitation  haloperidol    Injectable 5 milliGRAM(s) IntraMuscular once PRN severe agitation  hydrOXYzine hydrochloride 25 milliGRAM(s) Oral every 6 hours PRN anxiety  ibuprofen  Tablet. 400 milliGRAM(s) Oral every 6 hours PRN Moderate Pain (4 - 6)  LORazepam     Tablet 2 milliGRAM(s) Oral every 6 hours PRN agitation  LORazepam   Injectable 2 milliGRAM(s) IntraMuscular once PRN agitation  traZODone 50 milliGRAM(s) Oral at bedtime PRN insomnia      Vital Signs Last 24 Hrs  T(C): 36.7 (22 Apr 2021 14:35), Max: 36.7 (22 Apr 2021 14:35)  T(F): 98 (22 Apr 2021 14:35), Max: 98 (22 Apr 2021 14:35)  HR: --  BP: --  BP(mean): --  RR: 18 (22 Apr 2021 07:31) (18 - 18)  SpO2: 100% (22 Apr 2021 07:31) (100% - 100%)  CAPILLARY BLOOD GLUCOSE            PHYSICAL EXAM:  GENERAL: NAD, well-developed  HEAD:  Atraumatic, Normocephalic  EYES: EOMI, conjunctiva and sclera clear  NECK: Supple, No JVD  CHEST/LUNG: Clear to auscultation bilaterally; No wheeze  HEART: Regular rate and rhythm; No murmurs, rubs, or gallops  ABDOMEN: Soft, Nontender, Nondistended; Bowel sounds present  EXTREMITIES:  2+ Peripheral Pulses, small area of swelling along 5th metacarpal region of left hand. no collections, hematoma or erythema. ROM intact in the hand. grasp strength intact.   NEUROLOGY: non-focal  SKIN: No rashes or lesions    LABS:                    EKG(personally reviewed):    RADIOLOGY & ADDITIONAL TESTS:    Imaging Personally Reviewed:    Consultant(s) Notes Reviewed:      Care Discussed with Consultants/Other Providers:

## 2021-04-22 NOTE — BH INPATIENT PSYCHIATRY PROGRESS NOTE - NSBHFUPINTERVALHXFT_PSY_A_CORE
Patient is followed up for bipolar Disorder, admitted for psychosis in context of noncompliance with medications and substance use.  Chart, medications and labs reviewed. Patient was discussed with nursing staff. No overnight events reported and no PRN medication required for agitation. Approached the patient in bed. Noted the patient sleeping most of the morning compared to 2 days ago which he presented hyperactive and agitated. He denies current perceptual disturbances such as AH, VH, TH, "no voices today". He denies SI/ HI, intent and plan. Slept well last night and his appetite is good. He is compliant with his medication regimen and denies side effects. Group therapy, coping skills and fresh air breaks encouraged. Patient is followed up for bipolar Disorder, admitted for psychosis in context of noncompliance with medications and substance use. Chart, medications and labs reviewed. Patient was discussed with nursing staff. No overnight events reported and no PRN medication required for agitation. Approached the patient in bed. Noted the patient sleeping most of the morning compared to 2 days ago which he presented hyperactive and agitated. He denies current perceptual disturbances such as AH, VH, TH, "no voices today". He denies SI/ HI, intent and plan. Slept well last night and his appetite is good. He is compliant with his medication regimen and denies side effects. Group therapy, coping skills and fresh air breaks encouraged.

## 2021-04-22 NOTE — CONSULT NOTE ADULT - ASSESSMENT
20M with mechanical fall resulted in left hand injury.    #fall/hand pain  - mild injury, no concerning findings on exam at this time  - c/t monitor progression clinically.  - tylenol prn for pain.  - ice pack to area as needed.    #psych d/o  -management per primary team.

## 2021-04-22 NOTE — BH INPATIENT PSYCHIATRY PROGRESS NOTE - NSBHASSESSSUMMFT_PSY_ALL_CORE
Pt is a 19 y/o AA male, single, noncaregiver, lives with parents and 2 siblings,  diagnoses of Bipolar Disorder with psychosis, history of cannabis and K2 abuse, 3 past psych hospitalizations, first in 2014 at [facility] for psychosis and most recently at [facility] from 10/19/19-11/5/19, no current outpatient provider, noncompliant with psychiatric treatment since [facility] discharge, no history of self-injurious behavior or suicide attempts, no h/o violence or legal issues, no PMH, +regular cannabis and K2 use, brought in by cousin for not sleeping, paranoia, grandiosity, and disorganized thought process in context of medication noncompliance and likely ongoing cannabis and k2 use.  Patient continues to have labile mood, and difficulty concentrating and focusing on tasks. He continues to require inpatient psychiatric hospitalization for his Bipolar disorder.      Manic symptoms: hyperverbal, grandiosity, easily distracted, tangential, disorganized, paranoid, hypersexual, hyperactive, drawing pornographic pictures). Has poor insight into his illness. He denies SI/ HI, intent and plan. Admits to non-command AH which is improving. Notable responding to internal stimuli by talking to self when alone. Sleep and appetite are good. He is compliant with his medication regimen and denies side effects. Remains at risk for aggression.     >Obs: Routine, no current SI. no need for CO, patient not expected to pose risk to self or others in controlled inpatient setting  >Psychiatric Meds: Clonazepam increased to 1mg po twice a day ordered for nando. Risperdal 2mg po daily and 4mg po at bedtime, Depakote ER increased to 500mg po daily and 1000mg po at bedtime. Trazadone 50mg po at bedtime PRN.  >Lab: Valproate acid level is 59.5.  >Medical:  No acute concerns  >Social: milieu therapy  >Treatment Interventions: Groups and Individual Therapy  >Dispo: Collateral and dispo planning pending further symptom and medication optimization.   Pt is a 19 y/o AA male, single, noncaregiver, lives with parents and 2 siblings,  diagnoses of Bipolar Disorder with psychosis, history of cannabis and K2 abuse, 3 past psych hospitalizations, first in 2014 at [facility] for psychosis and most recently at [facility] from 10/19/19-11/5/19, no current outpatient provider, noncompliant with psychiatric treatment since [facility] discharge, no history of self-injurious behavior or suicide attempts, no h/o violence or legal issues, no PMH, +regular cannabis and K2 use, brought in by cousin for not sleeping, paranoia, grandiosity, and disorganized thought process in context of medication noncompliance and likely ongoing cannabis and k2 use.  Patient continues to have labile mood, and difficulty concentrating and focusing on tasks. He continues to require inpatient psychiatric hospitalization for his Bipolar disorder.      Manic symptoms: . grandiosity, easily distracted, tangential, disorganized, paranoid, hypersexual, hyperactive, drawing pornographic pictures). Has poor insight into his illness. He denies SI/ HI, intent and plan. Admits to non-command AH which is improving. Notable responding to internal stimuli by talking to self when alone. Sleep and appetite are good. He is compliant with his medication regimen and denies side effects. Remains at risk for aggression.     >Obs: Routine, no current SI. no need for CO, patient not expected to pose risk to self or others in controlled inpatient setting  >Psychiatric Meds: Clonazepam increased to 1mg po twice a day ordered for nando. Risperdal 2mg po daily and 4mg po at bedtime, Depakote ER increased to 500mg po daily and 1000mg po at bedtime. Trazadone 50mg po at bedtime PRN.  >Lab: Valproate acid level is 59.5.  >Medical:  No acute concerns  >Social: milieu therapy  >Treatment Interventions: Groups and Individual Therapy  >Dispo: Collateral and dispo planning pending further symptom and medication optimization.

## 2021-04-22 NOTE — BH INPATIENT PSYCHIATRY PROGRESS NOTE - OTHER
Bizarre, odd at times. Improving thought process seen talking to self, appears internally preoccupied

## 2021-04-23 PROCEDURE — 99232 SBSQ HOSP IP/OBS MODERATE 35: CPT

## 2021-04-23 RX ORDER — GUANFACINE 3 MG/1
1 TABLET, EXTENDED RELEASE ORAL AT BEDTIME
Refills: 0 | Status: DISCONTINUED | OUTPATIENT
Start: 2021-04-23 | End: 2021-04-23

## 2021-04-23 RX ORDER — ACETAMINOPHEN 500 MG
650 TABLET ORAL EVERY 6 HOURS
Refills: 0 | Status: DISCONTINUED | OUTPATIENT
Start: 2021-04-23 | End: 2021-05-03

## 2021-04-23 RX ORDER — ACETAMINOPHEN 500 MG
500 TABLET ORAL EVERY 6 HOURS
Refills: 0 | Status: DISCONTINUED | OUTPATIENT
Start: 2021-04-23 | End: 2021-04-23

## 2021-04-23 RX ORDER — DIVALPROEX SODIUM 500 MG/1
1500 TABLET, DELAYED RELEASE ORAL AT BEDTIME
Refills: 0 | Status: DISCONTINUED | OUTPATIENT
Start: 2021-04-23 | End: 2021-05-03

## 2021-04-23 RX ADMIN — Medication 1 MILLIGRAM(S): at 09:01

## 2021-04-23 RX ADMIN — Medication 400 MILLIGRAM(S): at 20:40

## 2021-04-23 RX ADMIN — Medication 1 MILLIGRAM(S): at 20:40

## 2021-04-23 RX ADMIN — RISPERIDONE 2 MILLIGRAM(S): 4 TABLET ORAL at 09:02

## 2021-04-23 RX ADMIN — DIVALPROEX SODIUM 500 MILLIGRAM(S): 500 TABLET, DELAYED RELEASE ORAL at 09:02

## 2021-04-23 RX ADMIN — DIVALPROEX SODIUM 1500 MILLIGRAM(S): 500 TABLET, DELAYED RELEASE ORAL at 20:40

## 2021-04-23 RX ADMIN — RISPERIDONE 4 MILLIGRAM(S): 4 TABLET ORAL at 20:40

## 2021-04-23 NOTE — BH INPATIENT PSYCHIATRY PROGRESS NOTE - NSBHASSESSSUMMFT_PSY_ALL_CORE
Pt is a 19 y/o AA male, single, noncaregiver, lives with parents and 2 siblings,  diagnoses of Bipolar Disorder with psychosis, history of cannabis and K2 abuse, 3 past psych hospitalizations, first in 2014 at [facility] for psychosis and most recently at [facility] from 10/19/19-11/5/19, no current outpatient provider, noncompliant with psychiatric treatment since [facility] discharge, no history of self-injurious behavior or suicide attempts, no h/o violence or legal issues, no PMH, +regular cannabis and K2 use, brought in by cousin for not sleeping, paranoia, grandiosity, and disorganized thought process in context of medication noncompliance and likely ongoing cannabis and k2 use.  Patient continues to have labile mood, and difficulty concentrating and focusing on tasks. He continues to require inpatient psychiatric hospitalization for his Bipolar disorder.      Manic symptoms: grandiosity, easily distracted, tangential, disorganized, paranoid, hypersexual, hyperactive, drawing pornographic pictures). Has poor insight into his illness. He denies SI/ HI, intent and plan. Admits to non-command AH which is improving. Notable responding to internal stimuli by talking to self when alone. Sleep and appetite are good. He is compliant with his medication regimen and denies side effects. Remains at risk for aggression.     >Obs: Routine, no current SI. no need for CO, patient not expected to pose risk to self or others in controlled inpatient setting  >Psychiatric Meds: Clonazepam increased to 1mg po twice a day ordered for nando. Risperdal 2mg po daily and 4mg po at bedtime, Depakote increased to ER increased to 500mg po daily and 1500mg po at bedtime. Trazadone 50mg po at bedtime PRN.  >Lab: Valproate acid level is 59.5, re-ordered for 4/26.  >Medical:  Noted small swelling on his left metatarsal 4th and 5th, s/p pt's report of a fall in the bathroom yesterday. Patient stated he had hand pain yesterday and was resolved with ibuprofen 400mg. Denies current pain and is able to make a fist. Hospitalist will continue to follow.  >Social: milieu therapy  >Treatment Interventions: Groups and Individual Therapy  >Dispo: Collateral and dispo planning pending further symptom and medication optimization.

## 2021-04-23 NOTE — BH INPATIENT PSYCHIATRY PROGRESS NOTE - NSBHFUPINTERVALHXFT_PSY_A_CORE
Patient is followed up for bipolar Disorder, admitted for psychosis in context of noncompliance with medications and substance use. Chart, medications and labs reviewed. Patient was discussed with nursing staff. No overnight events reported and no PRN medication required for agitation. He is visible on the unit and appears hyperactive, internally preoccupied and periods of disorganization. During the follow-up, noted the patient was superficially engaged and Perseverative about how to make money with various projects. He denies current perceptual disturbances such as AH, VH, TH, thought notably internally preoccupied. He denies SI/ HI, intent and plan. Slept well last night and his appetite is good. He is compliant with his medication regimen and denies side effects. Noted small swelling on his left metatarsal 4th and 5th, s/p pt's reports of a fall in the bathroom. Patient stated he had hand pain yesterday and was resolved with ibuprofen. Denies pain and is able to make a fist. Group therapy, coping skills and fresh air breaks encouraged.

## 2021-04-23 NOTE — BH INPATIENT PSYCHIATRY PROGRESS NOTE - OTHER
seen talking to self, appears internally preoccupied Improving thought process Bizarre, odd at times.

## 2021-04-24 RX ADMIN — Medication 400 MILLIGRAM(S): at 00:30

## 2021-04-24 RX ADMIN — Medication 1 MILLIGRAM(S): at 00:30

## 2021-04-24 RX ADMIN — Medication 1 MILLIGRAM(S): at 21:37

## 2021-04-24 RX ADMIN — Medication 1 MILLIGRAM(S): at 08:45

## 2021-04-24 RX ADMIN — Medication 400 MILLIGRAM(S): at 09:25

## 2021-04-24 RX ADMIN — RISPERIDONE 2 MILLIGRAM(S): 4 TABLET ORAL at 08:45

## 2021-04-24 RX ADMIN — Medication 50 MILLIGRAM(S): at 21:36

## 2021-04-24 RX ADMIN — Medication 1 MILLIGRAM(S): at 21:36

## 2021-04-24 RX ADMIN — DIVALPROEX SODIUM 500 MILLIGRAM(S): 500 TABLET, DELAYED RELEASE ORAL at 08:45

## 2021-04-24 RX ADMIN — DIVALPROEX SODIUM 1500 MILLIGRAM(S): 500 TABLET, DELAYED RELEASE ORAL at 21:36

## 2021-04-24 RX ADMIN — RISPERIDONE 4 MILLIGRAM(S): 4 TABLET ORAL at 21:36

## 2021-04-25 RX ADMIN — Medication 50 MILLIGRAM(S): at 19:41

## 2021-04-25 RX ADMIN — Medication 1 MILLIGRAM(S): at 19:41

## 2021-04-25 RX ADMIN — DIVALPROEX SODIUM 1500 MILLIGRAM(S): 500 TABLET, DELAYED RELEASE ORAL at 19:41

## 2021-04-25 RX ADMIN — Medication 1 MILLIGRAM(S): at 10:01

## 2021-04-25 RX ADMIN — DIVALPROEX SODIUM 500 MILLIGRAM(S): 500 TABLET, DELAYED RELEASE ORAL at 10:01

## 2021-04-25 RX ADMIN — RISPERIDONE 2 MILLIGRAM(S): 4 TABLET ORAL at 10:01

## 2021-04-25 RX ADMIN — Medication 1 MILLIGRAM(S): at 10:02

## 2021-04-25 RX ADMIN — RISPERIDONE 4 MILLIGRAM(S): 4 TABLET ORAL at 19:41

## 2021-04-26 DIAGNOSIS — Z71.89 OTHER SPECIFIED COUNSELING: ICD-10-CM

## 2021-04-26 PROCEDURE — 99232 SBSQ HOSP IP/OBS MODERATE 35: CPT

## 2021-04-26 RX ORDER — CLONAZEPAM 1 MG
1 TABLET ORAL
Refills: 0 | Status: DISCONTINUED | OUTPATIENT
Start: 2021-04-26 | End: 2021-04-30

## 2021-04-26 RX ADMIN — Medication 1 MILLIGRAM(S): at 21:45

## 2021-04-26 RX ADMIN — DIVALPROEX SODIUM 500 MILLIGRAM(S): 500 TABLET, DELAYED RELEASE ORAL at 11:04

## 2021-04-26 RX ADMIN — Medication 1 MILLIGRAM(S): at 11:05

## 2021-04-26 RX ADMIN — RISPERIDONE 4 MILLIGRAM(S): 4 TABLET ORAL at 21:45

## 2021-04-26 RX ADMIN — Medication 1 MILLIGRAM(S): at 21:46

## 2021-04-26 RX ADMIN — RISPERIDONE 2 MILLIGRAM(S): 4 TABLET ORAL at 11:05

## 2021-04-26 RX ADMIN — Medication 1 MILLIGRAM(S): at 11:04

## 2021-04-26 RX ADMIN — DIVALPROEX SODIUM 1500 MILLIGRAM(S): 500 TABLET, DELAYED RELEASE ORAL at 21:45

## 2021-04-26 NOTE — BH INPATIENT PSYCHIATRY PROGRESS NOTE - NSBHFUPINTERVALHXFT_PSY_A_CORE
Patient is followed up for bipolar Disorder, admitted for psychosis in context of noncompliance with medications and substance use. Chart, medications and labs reviewed. Patient was discussed with nursing staff. No overnight events reported and no PRN medication required for agitation. He is visible on the unit and social with peers. The patient appears less hyperactive/ hyperverbal today. During the follow-up, he continues to be superficially engaged and is discharge focused. He denies current perceptual disturbances such as AH, VH, TH, though notably internally preoccupied at times. He denies SI/ HI, intent and plan. Slept well last night and his appetite is good. He is compliant with his medication regimen and denies side effects. Group therapy, coping skills and fresh air breaks encouraged.

## 2021-04-26 NOTE — BH INPATIENT PSYCHIATRY PROGRESS NOTE - NSBHASSESSSUMMFT_PSY_ALL_CORE
Pt is a 21 y/o AA male, single, noncaregiver, lives with parents and 2 siblings,  diagnoses of Bipolar Disorder with psychosis, history of cannabis and K2 abuse, 3 past psych hospitalizations, first in 2014 at [facility] for psychosis and most recently at [facility] from 10/19/19-11/5/19, no current outpatient provider, noncompliant with psychiatric treatment since [facility] discharge, no history of self-injurious behavior or suicide attempts, no h/o violence or legal issues, no PMH, +regular cannabis and K2 use, brought in by cousin for not sleeping, paranoia, grandiosity, and disorganized thought process in context of medication noncompliance and likely ongoing cannabis and k2 use.  Patient continues to have labile mood, and difficulty concentrating and focusing on tasks. He continues to require inpatient psychiatric hospitalization for his Bipolar disorder.      Noted moderate improvement symptoms: grandiosity, easily distracted, tangential, disorganized, paranoid, hypersexual, hyperactive, drawing pornographic pictures). Has poor insight into his illness. He denies SI/ HI, intent and plan. Admits to non-command AH which is improving. Notable responding to internal stimuli by talking to self when alone. Sleep and appetite are good. He is compliant with his medication regimen and denies side effects. Remains at risk for aggression.     >Obs: Routine, no current SI. no need for CO, patient not expected to pose risk to self or others in controlled inpatient setting  >Psychiatric Meds: Clonazepam 1mg po twice a day ordered for nando. Risperdal 2mg po daily and 4mg po at bedtime, Depakote increased to ER 1500mg po at bedtime. Trazadone 50mg po at bedtime PRN.  >Lab: Valproate acid level is 59.5, re-ordered for 4/26 (pt refused).  >Medical:  Noted small swelling on his left metatarsal 4th and 5th, s/p pt's report of a fall in the bathroom yesterday. Patient stated he had hand pain yesterday and was resolved with ibuprofen 400mg. Denies current pain and is able to make a fist. Hospitalist will continue to follow.  >Social: milieu therapy  >Treatment Interventions: Groups and Individual Therapy  >Dispo: Collateral and dispo planning pending further symptom and medication optimization.

## 2021-04-27 PROCEDURE — 99232 SBSQ HOSP IP/OBS MODERATE 35: CPT

## 2021-04-27 RX ORDER — BENZTROPINE MESYLATE 1 MG
0.5 TABLET ORAL
Refills: 0 | Status: DISCONTINUED | OUTPATIENT
Start: 2021-04-27 | End: 2021-05-03

## 2021-04-27 RX ADMIN — Medication 1 MILLIGRAM(S): at 09:03

## 2021-04-27 RX ADMIN — Medication 1 MILLIGRAM(S): at 20:47

## 2021-04-27 RX ADMIN — RISPERIDONE 2 MILLIGRAM(S): 4 TABLET ORAL at 09:03

## 2021-04-27 RX ADMIN — RISPERIDONE 4 MILLIGRAM(S): 4 TABLET ORAL at 20:47

## 2021-04-27 RX ADMIN — Medication 0.5 MILLIGRAM(S): at 20:47

## 2021-04-27 RX ADMIN — DIVALPROEX SODIUM 1500 MILLIGRAM(S): 500 TABLET, DELAYED RELEASE ORAL at 20:47

## 2021-04-27 RX ADMIN — DIVALPROEX SODIUM 500 MILLIGRAM(S): 500 TABLET, DELAYED RELEASE ORAL at 09:03

## 2021-04-27 NOTE — BH INPATIENT PSYCHIATRY PROGRESS NOTE - NSBHMSESPABN_PSY_A_CORE
Loud volume/Pressured rate/Increased productivity
Soft volume/Slowed rate
Loud volume/Pressured rate/Increased productivity
Soft volume/Slowed rate
Soft volume/Slowed rate

## 2021-04-27 NOTE — BH INPATIENT PSYCHIATRY PROGRESS NOTE - NSBHASSESSSUMMFT_PSY_ALL_CORE
Pt is a 21 y/o AA male, single, noncaregiver, lives with parents and 2 siblings,  diagnoses of Bipolar Disorder with psychosis, history of cannabis and K2 abuse, 3 past psych hospitalizations, first in 2014 at [facility] for psychosis and most recently at [facility] from 10/19/19-11/5/19, no current outpatient provider, noncompliant with psychiatric treatment since [facility] discharge, no history of self-injurious behavior or suicide attempts, no h/o violence or legal issues, no PMH, +regular cannabis and K2 use, brought in by cousin for not sleeping, paranoia, grandiosity, and disorganized thought process in context of medication noncompliance and likely ongoing cannabis and k2 use.  Patient continues to have labile mood, and difficulty concentrating and focusing on tasks. He continues to require inpatient psychiatric hospitalization for his Bipolar disorder.      Noted moderate improvement symptoms: grandiosity, easily distracted, tangential, disorganized, paranoid, hypersexual, hyperactive, drawing pornographic pictures). Has poor insight into his illness. He denies SI/ HI, intent and plan. Admits to non-command AH which is improving. Notable responding to internal stimuli by talking to self when alone. Sleep and appetite are good. He is compliant with his medication regimen and denies side effects. Remains at risk for aggression.     >Obs: Routine, no current SI. no need for CO, patient not expected to pose risk to self or others in controlled inpatient setting  >Psychiatric Meds: Clonazepam 1mg po twice a day ordered for nando. Risperdal 2mg po daily and 4mg po at bedtime, Depakote increased to ER 1500mg po at bedtime. Trazadone 50mg po at bedtime PRN.  >Lab: Valproate acid level is 59.5, re-ordered for 4/26 (pt refused).  >Medical:  No current concerns.  >Social: milieu therapy  >Treatment Interventions: Groups and Individual Therapy  >Dispo: Collateral and dispo planning pending further symptom and medication optimization.

## 2021-04-27 NOTE — BH INPATIENT PSYCHIATRY PROGRESS NOTE - NSBHFUPINTERVALHXFT_PSY_A_CORE
Patient is followed up for bipolar Disorder, admitted for psychosis in context of noncompliance with medications and substance use. Chart, medications and labs reviewed. Patient was discussed with nursing staff. No overnight events reported and no PRN medication required for agitation. Approached the patient in bed. He reported he slept well last night but still drowsy in the morning. He continues to appear less hyperactive/ hyperverbal. The patient was superficially engaged in the follow-up and discharge focused, "when will I leave?". The treatment plan discussed at this time and he verbalized understanding. He denies current perceptual disturbances such as AH, VH, TH, and stated the last time he experienced AH was 2 days ago. He denies SI/ HI, intent and plan. He is compliant with his medication regimen and denies side effects. Group therapy, coping skills and fresh air breaks encouraged. The treatment plan and discharge plan discussed with his mother Amairani via phone.

## 2021-04-28 PROCEDURE — 99232 SBSQ HOSP IP/OBS MODERATE 35: CPT

## 2021-04-28 RX ADMIN — DIVALPROEX SODIUM 500 MILLIGRAM(S): 500 TABLET, DELAYED RELEASE ORAL at 08:40

## 2021-04-28 RX ADMIN — Medication 0.5 MILLIGRAM(S): at 20:26

## 2021-04-28 RX ADMIN — Medication 0.5 MILLIGRAM(S): at 08:40

## 2021-04-28 RX ADMIN — RISPERIDONE 4 MILLIGRAM(S): 4 TABLET ORAL at 20:26

## 2021-04-28 RX ADMIN — Medication 1 MILLIGRAM(S): at 08:40

## 2021-04-28 RX ADMIN — Medication 1 MILLIGRAM(S): at 20:26

## 2021-04-28 RX ADMIN — DIVALPROEX SODIUM 1500 MILLIGRAM(S): 500 TABLET, DELAYED RELEASE ORAL at 20:25

## 2021-04-28 RX ADMIN — RISPERIDONE 2 MILLIGRAM(S): 4 TABLET ORAL at 08:40

## 2021-04-28 NOTE — BH TREATMENT PLAN - NSTXDCOPLKINTERMD_PSY_ALL_CORE
Patient will be referred to outpatient services upon discharge.

## 2021-04-28 NOTE — BH TREATMENT PLAN - NSTXCAREGIVERPARTICIPATE_PSY_P_CORE
Family/Caregiver participated in identification of needs/problems/goals for treatment

## 2021-04-28 NOTE — BH TREATMENT PLAN - NSTXPATIENTPARTICIPATE_PSY_ALL_CORE
Patient participated in identification of needs/problems/goals for treatment
Patient participated in identification of needs/problems/goals for treatment
Patient superficially engaged./Patient participated in identification of needs/problems/goals for treatment

## 2021-04-28 NOTE — BH TREATMENT PLAN - NSCMSPTSTRENGTHS_PSY_ALL_CORE
Able to adapt/Expressive of emotions/Future/goal oriented/Humor/Intact family/Physically healthy/Positive attitude/Sense of Humor
Flexibility/Supportive family
Flexibility/Supportive family

## 2021-04-28 NOTE — BH TREATMENT PLAN - NSTXMEDICINTERPR_PSY_ALL_CORE
Pts has completed current goal, as patient has been compliant with medication and verbalized intent to remain compliant during current hospitalization. As patient has completed current goal, psych rehab staff will begin working with patient on identifying two to three benefits of medication compliance.
Pt's goal is to demonstrate medication compliance for seven consecutive days.
Pt has made minimal progress, as patient continues to present with poor insight and is unable to identify benefits of compliance at this time despite psychoeducation attempt from writer.

## 2021-04-28 NOTE — BH INPATIENT PSYCHIATRY PROGRESS NOTE - NSBHFUPINTERVALHXFT_PSY_A_CORE
Patient is followed up for bipolar Disorder, admitted for psychosis in context of noncompliance with medications and substance use. Chart, medications and labs reviewed. Patient was discussed with nursing staff. No overnight events reported and no PRN medication required for agitation. Approached the patient in the courtyard as he was playing basketball with peers. He appears less hyperactive/ hyperverbal. Patient reported verbal conflict with a peer last night and he was able to walk away from the patient. He continues to be superficially engaged in the follow-up and discharge focused. The treatment plan discussed at this time and he verbalized understanding. He denies current perceptual disturbances such as AH, VH, TH, and stated the last time he experienced AH was "couple of days ago, I don't want to talk about it". He denies SI/ HI, intent and plan. He is compliant with his medication regimen and denies side effects. Group therapy, coping skills and fresh air breaks encouraged.

## 2021-04-28 NOTE — BH TREATMENT PLAN - NSTXPLANTHERAPYSESSIONSFT_PSY_ALL_CORE
04-24-21  Type of therapy: Cognitive behavior therapy,Psychoeducation  Type of session: Individual  Level of patient participation: Engaged  Duration of participation: Less than 15 minutes  Therapy conducted by: Psych rehab  Therapy Summary: Upon approach, patient is in day area. Patient continues to present with poor insight into illness and has difficulty following conversation with writer due to impaired concentration. Writer attempted to psychoeducate client on symptoms of illness, however patient is minimally receptive. Writer encouraged client to remain compliant with treatment and take time to increase awareness surrounding benefits of compliance. Patient was receptive.     Patient has attended 1 psych rehab group during the last seven days. Patient has difficulty tolerating structure of groups, as patient often leaves early.  
  04-17-21  Type of therapy: Coping skills  Type of session: Individual  Level of patient participation: Participated with encouragement  Duration of participation: Less than 15 minutes  Therapy conducted by: Psych rehab  Therapy Summary: Upon approach, patient is in room. patient continues to present with poor insight into illness and often changes topic of conversation from current treatment to obtaining attention medication such as adderral. Patient was encouraged to continue following currnet medication regimen and utilize coping skills. Patient was receptive.     Patient has attended approximately 10% of psych rehab groups, however patient is unable to tolerate session structure.

## 2021-04-28 NOTE — BH TREATMENT PLAN - NSDCCRITERIA_PSY_ALL_CORE
Sx reduction, medication management, safety planning, milieu therapy, outpatient psychiatric care.  

## 2021-04-28 NOTE — BH TREATMENT PLAN - NSTXDISORGINTERMD_PSY_ALL_CORE
Medication to target disorganization utilized.

## 2021-04-28 NOTE — BH TREATMENT PLAN - NSPTSTATEDGOAL_PSY_ALL_CORE
Symptom improvement and discharge home.
"get rid of the voices" and discharge home.
"get rid of the voices" and discharge home.

## 2021-04-28 NOTE — BH TREATMENT PLAN - NSTXMEDICGOAL_PSY_ALL_CORE
Take all medications as prescribed
Take all medications as prescribed
Be able to describe the benefit of medication/treatment

## 2021-04-28 NOTE — BH TREATMENT PLAN - NSTXDISORGGOAL_PSY_ALL_CORE
Will make at least 3 goal and reality oriented statements during therapy
Will make at least 3 goal and reality oriented statements during therapy
Will demonstrate related thoughts for 5 min in conversation

## 2021-04-28 NOTE — BH TREATMENT PLAN - NSTXDCOPLKINTERSW_PSY_ALL_CORE
SW will provide support, insight, discharge planning, psycho-education, and maintain contact with identified supports.

## 2021-04-28 NOTE — BH INPATIENT PSYCHIATRY PROGRESS NOTE - NSBHASSESSSUMMFT_PSY_ALL_CORE
Pt is a 19 y/o AA male, single, noncaregiver, lives with parents and 2 siblings,  diagnoses of Bipolar Disorder with psychosis, history of cannabis and K2 abuse, 3 past psych hospitalizations, first in 2014 at [facility] for psychosis and most recently at [facility] from 10/19/19-11/5/19, no current outpatient provider, noncompliant with psychiatric treatment since [facility] discharge, no history of self-injurious behavior or suicide attempts, no h/o violence or legal issues, no PMH, +regular cannabis and K2 use, brought in by cousin for not sleeping, paranoia, grandiosity, and disorganized thought process in context of medication noncompliance and likely ongoing cannabis and k2 use.  Patient continues to have labile mood, and difficulty concentrating and focusing on tasks. He continues to require inpatient psychiatric hospitalization for his Bipolar disorder.      Noted moderate improvement symptoms: grandiosity, easily distracted, tangential, disorganized, paranoid, hypersexual, hyperactive, drawing pornographic pictures). Has poor insight into his illness. He denies SI/ HI, intent and plan. Admits to non-command AH which is improving. Notable responding to internal stimuli by talking to self when alone. Sleep and appetite are good. He is compliant with his medication regimen and denies side effects. Remains at risk for aggression.     >Obs: Routine, no current SI. no need for CO, patient not expected to pose risk to self or others in controlled inpatient setting  >Psychiatric Meds: Clonazepam 1mg po twice a day ordered for nando. Risperdal 2mg po daily and 4mg po at bedtime, Depakote increased to ER 1500mg po at bedtime. Trazadone 50mg po at bedtime PRN.  >Lab: Valproate acid level is 59.5, re-ordered for 4/29.  >Medical:  No current concerns.  >Social: milieu therapy  >Treatment Interventions: Groups and Individual Therapy  >Dispo: Collateral and dispo planning pending further symptom and medication optimization.

## 2021-04-29 LAB — VALPROATE SERPL-MCNC: 82.6 UG/ML — SIGNIFICANT CHANGE UP (ref 50–100)

## 2021-04-29 PROCEDURE — 99232 SBSQ HOSP IP/OBS MODERATE 35: CPT

## 2021-04-29 RX ADMIN — RISPERIDONE 2 MILLIGRAM(S): 4 TABLET ORAL at 09:37

## 2021-04-29 RX ADMIN — Medication 0.5 MILLIGRAM(S): at 21:02

## 2021-04-29 RX ADMIN — DIVALPROEX SODIUM 500 MILLIGRAM(S): 500 TABLET, DELAYED RELEASE ORAL at 09:37

## 2021-04-29 RX ADMIN — Medication 1 MILLIGRAM(S): at 21:02

## 2021-04-29 RX ADMIN — Medication 1 MILLIGRAM(S): at 09:38

## 2021-04-29 RX ADMIN — RISPERIDONE 4 MILLIGRAM(S): 4 TABLET ORAL at 21:02

## 2021-04-29 RX ADMIN — Medication 0.5 MILLIGRAM(S): at 09:37

## 2021-04-29 RX ADMIN — DIVALPROEX SODIUM 1500 MILLIGRAM(S): 500 TABLET, DELAYED RELEASE ORAL at 21:02

## 2021-04-29 NOTE — BH INPATIENT PSYCHIATRY PROGRESS NOTE - NSBHFUPINTERVALHXFT_PSY_A_CORE
Patient is followed up for bipolar Disorder, admitted for psychosis in context of noncompliance with medications and substance use. Chart, medications and labs reviewed. Patient was discussed with nursing staff. No overnight events reported and no PRN medication required for agitation. His sleep and appetite are "good". Noted the patient takes frequent naps during the day. The patient continues to be discharge focused and superficially engaged in the follow-up today. When asked about AH, the patient laughed and refused to question the question. He denies current SI/ HI, intent and plan. He is compliant with his medication regimen and denies side effects. Group therapy, coping skills and fresh air breaks encouraged.

## 2021-04-29 NOTE — BH INPATIENT PSYCHIATRY PROGRESS NOTE - NSBHASSESSSUMMFT_PSY_ALL_CORE
Pt is a 21 y/o AA male, single, noncaregiver, lives with parents and 2 siblings,  diagnoses of Bipolar Disorder with psychosis, history of cannabis and K2 abuse, 3 past psych hospitalizations, first in 2014 at [facility] for psychosis and most recently at [facility] from 10/19/19-11/5/19, no current outpatient provider, noncompliant with psychiatric treatment since [facility] discharge, no history of self-injurious behavior or suicide attempts, no h/o violence or legal issues, no PMH, +regular cannabis and K2 use, brought in by cousin for not sleeping, paranoia, grandiosity, and disorganized thought process in context of medication noncompliance and likely ongoing cannabis and k2 use.  Patient continues to have labile mood, and difficulty concentrating and focusing on tasks. He continues to require inpatient psychiatric hospitalization for his Bipolar disorder.      Noted moderate improvement symptoms: grandiosity, easily distracted, tangential, disorganized, paranoid, hypersexual, hyperactive, drawing pornographic pictures). Has poor insight into his illness. He denies SI/ HI, intent and plan. Refused to answer questions about AH today. Internally preoccupied at times. Sleep and appetite are good. He is compliant with his medication regimen and denies side effects including symptoms of hyperprolactinemia. Remains at risk for aggression.     >Obs: Routine, no current SI. no need for CO, patient not expected to pose risk to self or others in controlled inpatient setting  >Psychiatric Meds: Clonazepam 1mg po twice a day ordered for nando. Risperdal 2mg po daily and 4mg po at bedtime, Depakote increased to ER 1500mg po at bedtime. Trazadone 50mg po at bedtime PRN.  >Lab: Valproate acid level today 4/29 is 82.6.  >Medical:  No current concerns.  >Social: milieu therapy  >Treatment Interventions: Groups and Individual Therapy  >Dispo: Collateral and dispo planning pending further symptom and medication optimization.

## 2021-04-30 PROCEDURE — 99232 SBSQ HOSP IP/OBS MODERATE 35: CPT

## 2021-04-30 RX ORDER — CLONAZEPAM 1 MG
0.5 TABLET ORAL
Refills: 0 | Status: DISCONTINUED | OUTPATIENT
Start: 2021-04-30 | End: 2021-05-03

## 2021-04-30 RX ORDER — BENZTROPINE MESYLATE 1 MG
1 TABLET ORAL
Qty: 28 | Refills: 0
Start: 2021-04-30 | End: 2021-05-13

## 2021-04-30 RX ORDER — CLONAZEPAM 1 MG
1 TABLET ORAL
Qty: 14 | Refills: 0
Start: 2021-04-30 | End: 2021-05-06

## 2021-04-30 RX ORDER — RISPERIDONE 4 MG/1
3 TABLET ORAL
Qty: 42 | Refills: 0
Start: 2021-04-30 | End: 2021-05-13

## 2021-04-30 RX ORDER — DIVALPROEX SODIUM 500 MG/1
4 TABLET, DELAYED RELEASE ORAL
Qty: 56 | Refills: 0
Start: 2021-04-30 | End: 2021-05-13

## 2021-04-30 RX ADMIN — RISPERIDONE 2 MILLIGRAM(S): 4 TABLET ORAL at 08:12

## 2021-04-30 RX ADMIN — Medication 0.5 MILLIGRAM(S): at 21:41

## 2021-04-30 RX ADMIN — DIVALPROEX SODIUM 500 MILLIGRAM(S): 500 TABLET, DELAYED RELEASE ORAL at 08:12

## 2021-04-30 RX ADMIN — DIVALPROEX SODIUM 1500 MILLIGRAM(S): 500 TABLET, DELAYED RELEASE ORAL at 21:39

## 2021-04-30 RX ADMIN — Medication 0.5 MILLIGRAM(S): at 08:11

## 2021-04-30 RX ADMIN — RISPERIDONE 4 MILLIGRAM(S): 4 TABLET ORAL at 21:39

## 2021-04-30 RX ADMIN — Medication 50 MILLIGRAM(S): at 21:39

## 2021-04-30 RX ADMIN — Medication 1 MILLIGRAM(S): at 08:12

## 2021-04-30 RX ADMIN — Medication 0.5 MILLIGRAM(S): at 21:39

## 2021-04-30 NOTE — BH INPATIENT PSYCHIATRY PROGRESS NOTE - NSBHFUPINTERVALHXFT_PSY_A_CORE
Patient is followed up for bipolar Disorder, admitted for psychosis in context of noncompliance with medications and substance use. Chart, medications and labs reviewed. Patient was discussed with nursing staff. No overnight events reported and no PRN medication required for agitation. His sleep and appetite are "okay". The patient continues to sleep during the day. He was superficially engaged during the follow-up. He denies perceptual disturbances such as AH, VH, TH. He denies current SI/ HI, intent and plan. He is compliant with his medication regimen and denies side effects. Group therapy, coping skills and fresh air breaks encouraged.

## 2021-04-30 NOTE — BH INPATIENT PSYCHIATRY PROGRESS NOTE - NSBHASSESSSUMMFT_PSY_ALL_CORE
Pt is a 19 y/o AA male, single, noncaregiver, lives with parents and 2 siblings,  diagnoses of Bipolar Disorder with psychosis, history of cannabis and K2 abuse, 3 past psych hospitalizations, first in 2014 at [facility] for psychosis and most recently at [facility] from 10/19/19-11/5/19, no current outpatient provider, noncompliant with psychiatric treatment since [facility] discharge, no history of self-injurious behavior or suicide attempts, no h/o violence or legal issues, no PMH, +regular cannabis and K2 use, brought in by cousin for not sleeping, paranoia, grandiosity, and disorganized thought process in context of medication noncompliance and likely ongoing cannabis and k2 use.  Patient continues to have labile mood, and difficulty concentrating and focusing on tasks. He continues to require inpatient psychiatric hospitalization for his Bipolar disorder.      Noted moderate improvement symptoms: grandiosity, easily distracted, tangential, disorganized, paranoid, hypersexual, hyperactive, drawing pornographic pictures). Has poor insight into his illness. He denies SI/ HI, intent and plan. Refused to answer questions about AH today. Internally preoccupied at times. Sleep and appetite are good. He is compliant with his medication regimen and denies side effects including symptoms of hyperprolactinemia. Remains at risk for aggression.     >Obs: Routine, no current SI. no need for CO, patient not expected to pose risk to self or others in controlled inpatient setting  >Psychiatric Meds: Continue current medication regimen.   >Lab: Valproate acid level today 4/29 is 82.6.  >Medical:  No current concerns.  >Social: milieu therapy  >Treatment Interventions: Groups and Individual Therapy  >Dispo: Collateral and dispo planning pending further symptom and medication optimization.

## 2021-05-01 RX ADMIN — DIVALPROEX SODIUM 1500 MILLIGRAM(S): 500 TABLET, DELAYED RELEASE ORAL at 20:34

## 2021-05-01 RX ADMIN — RISPERIDONE 2 MILLIGRAM(S): 4 TABLET ORAL at 08:44

## 2021-05-01 RX ADMIN — Medication 0.5 MILLIGRAM(S): at 08:44

## 2021-05-01 RX ADMIN — RISPERIDONE 4 MILLIGRAM(S): 4 TABLET ORAL at 20:34

## 2021-05-01 RX ADMIN — Medication 0.5 MILLIGRAM(S): at 20:34

## 2021-05-01 RX ADMIN — DIVALPROEX SODIUM 500 MILLIGRAM(S): 500 TABLET, DELAYED RELEASE ORAL at 08:44

## 2021-05-02 RX ADMIN — RISPERIDONE 2 MILLIGRAM(S): 4 TABLET ORAL at 08:36

## 2021-05-02 RX ADMIN — Medication 0.5 MILLIGRAM(S): at 08:36

## 2021-05-02 RX ADMIN — DIVALPROEX SODIUM 1500 MILLIGRAM(S): 500 TABLET, DELAYED RELEASE ORAL at 20:04

## 2021-05-02 RX ADMIN — Medication 0.5 MILLIGRAM(S): at 20:03

## 2021-05-02 RX ADMIN — DIVALPROEX SODIUM 500 MILLIGRAM(S): 500 TABLET, DELAYED RELEASE ORAL at 08:36

## 2021-05-02 RX ADMIN — RISPERIDONE 4 MILLIGRAM(S): 4 TABLET ORAL at 20:03

## 2021-05-02 NOTE — BH DISCHARGE NOTE NURSING/SOCIAL WORK/PSYCH REHAB - NSDCPRGOAL_PSY_ALL_CORE
Pt made some progress towards his discharge. Pt has demonstrated daily compliant with medication regimen and dosage. During the individual therapy session, pt has verbally agreed to comply with medication post-discharge. Pt has identified benefits of medication compliance such as feeling calmer and sleep better to manage symptoms. Pt currently denies SI, HI, AH, and VH. Pt was observed to be calmer, less internally preoccupied, clear thought process towards his discharge.   Pt showed approximately 60% of group attendance during this hospitalization. During the group session, pt was able to tolerate group structure, actively participated with relevant feedback. Pt was visible on the unit and showed good interactions with others. Pt showed fair ADLs.   Pt has participated in patient safety plan. Pt was provided press ganey survey.

## 2021-05-02 NOTE — BH DISCHARGE NOTE NURSING/SOCIAL WORK/PSYCH REHAB - NSDCPRRECOMMEND_PSY_ALL_CORE
Please follow up treatment with  Please follow up treatment with [facility].

## 2021-05-02 NOTE — BH DISCHARGE NOTE NURSING/SOCIAL WORK/PSYCH REHAB - PATIENT PORTAL LINK FT
You can access the FollowMyHealth Patient Portal offered by [facility] by registering at the following website: [email or web address]. By joining [facility]’s FollowMyHealth portal, you will also be able to view your health information using other applications (apps) compatible with our system.

## 2021-05-02 NOTE — BH DISCHARGE NOTE NURSING/SOCIAL WORK/PSYCH REHAB - NSCDUDCCRISIS_PSY_A_CORE
[facility] Well  1 [phone]) [facility]-WELL ([phone])  Text "WELL" to 66773  Website: www.Backand/.[employer] 1 (219) 441-PYNR (8079) Website: www.safehorizon.org/.National Suicide Prevention Lifeline 5 (412) 692-4472/.  [employer]  1 (488) LIFENET (046-3465)/.  [facility]  [address] [place] [address] [phone]   Hours:  Monday through Friday from 9 AM to 3 PM/.  [employer] - Veterans Crisis Line  5 [phone], Option 1

## 2021-05-02 NOTE — BH DISCHARGE NOTE NURSING/SOCIAL WORK/PSYCH REHAB - NSBHDCADDR2FT_A_CORE
This appointment is via telehealth. Please have your phone charged and be prepared to answer call.     [address] [place] [address]

## 2021-05-03 VITALS — TEMPERATURE: 99 F

## 2021-05-03 PROCEDURE — 99238 HOSP IP/OBS DSCHRG MGMT 30/<: CPT

## 2021-05-03 RX ADMIN — DIVALPROEX SODIUM 500 MILLIGRAM(S): 500 TABLET, DELAYED RELEASE ORAL at 09:40

## 2021-05-03 RX ADMIN — RISPERIDONE 2 MILLIGRAM(S): 4 TABLET ORAL at 09:40

## 2021-05-03 RX ADMIN — Medication 0.5 MILLIGRAM(S): at 09:40

## 2021-05-03 RX ADMIN — Medication 0.5 MILLIGRAM(S): at 09:41

## 2021-05-03 NOTE — BH INPATIENT PSYCHIATRY PROGRESS NOTE - NSBHCHARTREVIEWINVESTIGATE_PSY_A_CORE FT
See results. 

## 2021-05-03 NOTE — BH INPATIENT PSYCHIATRY PROGRESS NOTE - NSTXMEDICDATEEST_PSY_ALL_CORE
14-Apr-2021
20-Apr-2021
14-Apr-2021
28-Apr-2021
10-Apr-2021
17-Apr-2021
28-Apr-2021
09-Apr-2021
10-Apr-2021
28-Apr-2021
17-Apr-2021
20-Apr-2021
20-Apr-2021
10-Apr-2021
14-Apr-2021

## 2021-05-03 NOTE — BH INPATIENT PSYCHIATRY PROGRESS NOTE - NSBHMSEAFFRANGE_PSY_A_CORE
Labile/Constricted
Constricted
Labile/Constricted
Constricted
Constricted
Labile/Constricted
Labile/Constricted
Full/Constricted
Labile/Constricted
Constricted
Labile/Constricted

## 2021-05-03 NOTE — BH INPATIENT PSYCHIATRY PROGRESS NOTE - NSICDXBHSECONDARYDX_PSY_ALL_CORE
Polysubstance abuse   F19.10  
Orthopedic
Polysubstance abuse   F19.10  

## 2021-05-03 NOTE — BH INPATIENT PSYCHIATRY PROGRESS NOTE - NSTXMEDICGOAL_PSY_ALL_CORE
Be able to describe the benefit of medication/treatment
Take all medications as prescribed
Be able to describe the benefit of medication/treatment
Be able to describe the benefit of medication/treatment
Take all medications as prescribed
Be able to describe the benefit of medication/treatment
Take all medications as prescribed
Be able to describe the benefit of medication/treatment
Take all medications as prescribed

## 2021-05-03 NOTE — BH INPATIENT PSYCHIATRY PROGRESS NOTE - NSBHCHARTREVIEWVS_PSY_A_CORE FT
Vital Signs Last 24 Hrs  T(C): 36.6 (18 Apr 2021 22:13), Max: 36.7 (18 Apr 2021 20:46)  T(F): 97.8 (18 Apr 2021 22:13), Max: 98 (18 Apr 2021 20:46)  HR: --95  BP: --109/57  BP(mean): --  RR: 18 (18 Apr 2021 20:46) (18 - 18)  SpO2: 99% (18 Apr 2021 20:46) (99% - 99%)
Vital Signs Last 24 Hrs  T(C): 36.7 (28 Apr 2021 14:33), Max: 36.9 (27 Apr 2021 22:32)  T(F): 98 (28 Apr 2021 14:33), Max: 98.5 (27 Apr 2021 22:32)  HR: --92  BP: 133/75 (28 Apr 2021 07:53) (133/75 - 133/75)  BP(mean): 92 (28 Apr 2021 07:53) (92 - 92)  RR: --  SpO2: --
Vital Signs Last 24 Hrs  T(C): 36.3 (26 Apr 2021 14:18), Max: 36.9 (25 Apr 2021 23:36)  T(F): 97.4 (26 Apr 2021 14:18), Max: 98.4 (25 Apr 2021 23:36)  HR: 82 (26 Apr 2021 08:04) (82 - 82)  BP: 116/67 (26 Apr 2021 08:04) (116/67 - 116/67)  BP(mean): --  RR: 18 (25 Apr 2021 23:36) (18 - 18)  SpO2: 99% (25 Apr 2021 23:36) (99% - 99%)
Vital Signs Last 24 Hrs  T(C): 36 (13 Apr 2021 14:25), Max: 36.5 (13 Apr 2021 06:43)  T(F): 96.8 (13 Apr 2021 14:25), Max: 97.7 (13 Apr 2021 06:43)  HR: --95  BP: --107/65  BP(mean): --  RR: 16 (12 Apr 2021 21:57) (16 - 16)  SpO2: 99% (12 Apr 2021 21:57) (99% - 99%)
Vital Signs Last 24 Hrs  T(C): 36.2 (29 Apr 2021 06:22), Max: 36.3 (28 Apr 2021 22:36)  T(F): 97.1 (29 Apr 2021 06:22), Max: 97.3 (28 Apr 2021 22:36)  HR: 82 (29 Apr 2021 08:24) (82 - 82)  BP: 100/62 (29 Apr 2021 08:24) (100/62 - 100/62)  BP(mean): --  RR: --  SpO2: --
Vital Signs Last 24 Hrs  T(C): 37 (03 May 2021 06:28), Max: 37 (03 May 2021 06:28)  T(F): 98.6 (03 May 2021 06:28), Max: 98.6 (03 May 2021 06:28)  HR: --91  BP: --135/77  BP(mean): --  RR: 18 (02 May 2021 20:49) (18 - 18)  SpO2: 99% (02 May 2021 20:49) (99% - 99%)
Vital Signs Last 24 Hrs  T(C): 36.4 (23 Apr 2021 14:25), Max: 36.4 (22 Apr 2021 20:52)  T(F): 97.5 (23 Apr 2021 14:25), Max: 97.5 (22 Apr 2021 20:52)  HR: --  BP: --  BP(mean): --  RR: 17 (23 Apr 2021 09:06) (17 - 20)  SpO2: 99% (23 Apr 2021 09:06) (99% - 100%)
Vital Signs Last 24 Hrs  T(C): 36.5 (21 Apr 2021 14:24), Max: 36.7 (20 Apr 2021 23:22)  T(F): 97.7 (21 Apr 2021 14:24), Max: 98 (20 Apr 2021 23:22)  HR: --  BP: --  BP(mean): --  RR: 18 (20 Apr 2021 23:22) (18 - 18)  SpO2: 100% (20 Apr 2021 23:22) (100% - 100%)
Vital Signs Last 24 Hrs  T(C): 36.6 (12 Apr 2021 14:27), Max: 36.6 (11 Apr 2021 18:44)  T(F): 97.8 (12 Apr 2021 14:27), Max: 97.9 (11 Apr 2021 18:44)  HR: 95 (12 Apr 2021 08:04) (95 - 95)  BP: 124/92 (12 Apr 2021 08:04) (124/92 - 124/92)  BP(mean): --  RR: 20 (11 Apr 2021 21:54) (20 - 20)  SpO2: 98% (11 Apr 2021 21:54) (98% - 98%)
Vital Signs Last 24 Hrs  T(C): 36.5 (14 Apr 2021 08:20), Max: 36.8 (13 Apr 2021 22:16)  T(F): 97.7 (14 Apr 2021 08:20), Max: 98.2 (13 Apr 2021 22:16)  HR: --88  BP: --121/85  BP(mean): --  RR: 17 (14 Apr 2021 08:20) (17 - 20)  SpO2: 100% (13 Apr 2021 22:16) (100% - 100%)
Vital Signs Last 24 Hrs  T(C): 36.1 (15 Apr 2021 06:44), Max: 36.9 (14 Apr 2021 19:27)  T(F): 97 (15 Apr 2021 06:44), Max: 98.4 (14 Apr 2021 19:27)  HR: 87 (15 Apr 2021 08:16) (87 - 87)  BP: 138/62 (15 Apr 2021 08:16) (138/62 - 138/62)  BP(mean): --  RR: 16 (14 Apr 2021 21:25) (16 - 16)  SpO2: 100% (14 Apr 2021 21:25) (100% - 100%)
Vital Signs Last 24 Hrs  T(C): 36.2 (20 Apr 2021 06:23), Max: 36.2 (19 Apr 2021 14:29)  T(F): 97.1 (20 Apr 2021 06:23), Max: 97.2 (19 Apr 2021 14:29)  HR: --76  BP: --121/88  BP(mean): --  RR: 16 (19 Apr 2021 21:34) (16 - 16)  SpO2: 100% (19 Apr 2021 21:34) (100% - 100%)
Vital Signs Last 24 Hrs  T(C): 36.7 (22 Apr 2021 14:35), Max: 36.7 (22 Apr 2021 14:35)  T(F): 98 (22 Apr 2021 14:35), Max: 98 (22 Apr 2021 14:35)  HR: --78  BP: --124/73  BP(mean): --  RR: 18 (22 Apr 2021 07:31) (18 - 18)  SpO2: 100% (22 Apr 2021 07:31) (100% - 100%)
Vital Signs Last 24 Hrs  T(C): 36.6 (11 Apr 2021 06:24), Max: 36.6 (10 Apr 2021 14:42)  T(F): 97.9 (11 Apr 2021 06:24), Max: 97.9 (10 Apr 2021 14:42)  HR: --  BP: --  BP(mean): --  RR: 20 (10 Apr 2021 23:30) (20 - 20)  SpO2: 98% (10 Apr 2021 23:30) (98% - 98%)
Vital Signs Last 24 Hrs  T(C): 36.4 (30 Apr 2021 14:42), Max: 37.1 (30 Apr 2021 06:01)  T(F): 97.6 (30 Apr 2021 14:42), Max: 98.7 (30 Apr 2021 06:01)  HR: --92  BP: --114/69  BP(mean): --  RR: 18 (29 Apr 2021 21:30) (18 - 18)  SpO2: 99% (29 Apr 2021 21:30) (99% - 99%)
Vital Signs Last 24 Hrs  T(C): 36.3 (27 Apr 2021 06:54), Max: 36.7 (26 Apr 2021 22:30)  T(F): 97.4 (27 Apr 2021 06:54), Max: 98 (26 Apr 2021 22:30)  HR: --83  BP: --102/60  BP(mean): --  RR: --  SpO2: --
Vital Signs Last 24 Hrs  T(C): 36.4 (16 Apr 2021 06:21), Max: 36.4 (16 Apr 2021 06:21)  T(F): 97.6 (16 Apr 2021 06:21), Max: 97.6 (16 Apr 2021 06:21)  HR: --76  BP: --125/57  BP(mean): --  RR: 20 (15 Apr 2021 23:56) (20 - 20)  SpO2: 100% (15 Apr 2021 23:56) (100% - 100%)

## 2021-05-03 NOTE — BH INPATIENT PSYCHIATRY PROGRESS NOTE - NSBHASSESSSUMMFT_PSY_ALL_CORE
Pt is a 19 y/o AA male, single, noncaregiver, lives with parents and 2 siblings,  diagnoses of Bipolar Disorder with psychosis, history of cannabis and K2 abuse, 3 past psych hospitalizations, first in 2014 at [facility] for psychosis and most recently at [facility] [place] [facility] from 10/19/19-11/5/19, no current outpatient provider, noncompliant with psychiatric treatment since [facility] discharge, no history of self-injurious behavior or suicide attempts, no h/o violence or legal issues, no PMH, +regular cannabis and K2 use, brought in by cousin for not sleeping, paranoia, grandiosity, and disorganized thought process in context of medication noncompliance and likely ongoing cannabis and k2 use.  Patient continues to have labile mood, and difficulty concentrating and focusing on tasks. He continues to require inpatient psychiatric hospitalization for his Bipolar disorder.      Noted moderate improvement symptoms: grandiosity, easily distracted, tangential, disorganized, paranoid, hypersexual, hyperactive, drawing pornographic pictures). Improved insight into his illness. He denied SI/ HI, intent and plan. Denies perceptual disturbances. Internally preoccupied at times. Sleep and appetite are good. He is compliant with his medication regimen and denies side effects including symptoms of hyperprolactinemia. Remains at risk for aggression.     >Obs: Routine, no current SI. no need for CO, patient not expected to pose risk to self or others in controlled inpatient setting  >Psychiatric Meds: Continue current medication regimen.   >Lab: continue current medication regimen.  >Medical:  No current concerns.  >Social: milieu therapy  >Treatment Interventions: Groups and Individual Therapy  >Dispo: Discharge home.

## 2021-05-03 NOTE — BH INPATIENT PSYCHIATRY PROGRESS NOTE - NSBHMSESPEECH_PSY_A_CORE
Abnormal as indicated, otherwise normal...
Normal volume, rate, productivity, spontaneity and articulation
Abnormal as indicated, otherwise normal...
Normal volume, rate, productivity, spontaneity and articulation
Abnormal as indicated, otherwise normal...
Abnormal as indicated, otherwise normal...

## 2021-05-03 NOTE — BH INPATIENT PSYCHIATRY PROGRESS NOTE - NSTXPROBDCOPLK_PSY_ALL_CORE
DISCHARGE ISSUE - LACK OF APPROPRIATE OUTPATIENT SERVICES

## 2021-05-03 NOTE — BH INPATIENT PSYCHIATRY PROGRESS NOTE - NSTXDISORGDATETRGT_PSY_ALL_CORE
05-May-2021
21-Apr-2021
05-May-2021
17-Apr-2021
27-Apr-2021
21-Apr-2021
03-May-2021
27-Apr-2021
21-Apr-2021
17-Apr-2021
17-Apr-2021
27-Apr-2021
21-Apr-2021
21-Apr-2021
27-Apr-2021
05-May-2021
27-Apr-2021

## 2021-05-03 NOTE — BH INPATIENT PSYCHIATRY DISCHARGE NOTE - NSICDXPASTMEDICALHX_GEN_ALL_CORE_FT
PAST MEDICAL HISTORY:  ADHD (attention deficit hyperactivity disorder)     Schizophrenia, unspecified type      PAST MEDICAL HISTORY:  ADHD (attention deficit hyperactivity disorder)     Bipolar disorder with psychotic features

## 2021-05-03 NOTE — BH INPATIENT PSYCHIATRY DISCHARGE NOTE - NSDCMRMEDTOKEN_GEN_ALL_CORE_FT
benztropine 0.5 mg oral tablet: 1 tab(s) orally 2 times a day  clonazePAM 0.5 mg oral tablet: 1 tab(s) orally 2 times a day MDD:1mg  Depakote  mg oral tablet, extended release: 4 tab(s) orally once a day     Take 1 tablet in the morning and 3 tablets at bedtime.   RisperDAL 2 mg oral tablet: 3 tab(s) orally once a day   Take 1 tablet daily and 2 tablets at bedtime.

## 2021-05-03 NOTE — BH INPATIENT PSYCHIATRY PROGRESS NOTE - NSTXDCOPLKPROGRES_PSY_ALL_CORE
Improving
Improving
No Change
No Change
Improving
No Change
Improving
Improving
No Change
Improving
No Change
Improving

## 2021-05-03 NOTE — BH INPATIENT PSYCHIATRY PROGRESS NOTE - NSBHFUPINTERVALCCFT_PSY_A_CORE
F/u for nando, psychosis, aggression
Magalie, psychosis, aggression
F/u for nando, psychosis, aggression
Discharge planning
Magalie, psychosis, aggression
F/u for nando, psychosis, aggression

## 2021-05-03 NOTE — BH INPATIENT PSYCHIATRY DISCHARGE NOTE - VIOLENCE RISK FACTORS:
Violent ideation/threat/speech/Irritability Violent ideation/threat/speech/Impulsivity/Lack of insight into violence risk/need for treatment/Noncompliance with treatment/Irritability

## 2021-05-03 NOTE — BH INPATIENT PSYCHIATRY PROGRESS NOTE - NSBHANTIPSYCHOTIC_PSY_ALL_CORE
Yes...

## 2021-05-03 NOTE — BH INPATIENT PSYCHIATRY PROGRESS NOTE - NSTXDISORGDATEEST_PSY_ALL_CORE
14-Apr-2021
14-Apr-2021
28-Apr-2021
20-Apr-2021
14-Apr-2021
28-Apr-2021
28-Apr-2021
20-Apr-2021
20-Apr-2021
09-Apr-2021
14-Apr-2021
20-Apr-2021
14-Apr-2021
20-Apr-2021

## 2021-05-03 NOTE — BH INPATIENT PSYCHIATRY PROGRESS NOTE - NSBHMSEREMMEM_PSY_A_CORE
Impaired
Unable to assess
Impaired
Unable to assess
Unable to assess
Impaired

## 2021-05-03 NOTE — BH INPATIENT PSYCHIATRY PROGRESS NOTE - NSBHINPTBILLING_PSY_ALL_CORE
70000 - Inpatient Moderate Complexity
86801 - Inpatient Moderate Complexity
57400 - Inpatient Moderate Complexity
57321 - Inpatient Moderate Complexity
37427 - Inpatient Moderate Complexity
26858 - Inpatient Moderate Complexity
79840 - Inpatient Moderate Complexity
07263 - Inpatient Moderate Complexity
57009 - Inpatient Moderate Complexity
03643 - Inpatient Moderate Complexity
68404 - Inpatient Moderate Complexity
54194 - Inpatient Moderate Complexity
13520 - Inpatient Moderate Complexity
67181 - Inpatient Moderate Complexity
44243 - Inpatient Moderate Complexity
53354 - Inpatient Moderate Complexity
82700 - Inpatient Moderate Complexity

## 2021-05-03 NOTE — BH INPATIENT PSYCHIATRY PROGRESS NOTE - NSBHMSEGAIT_PSY_A_CORE
Normal gait / station
Normal gait / station
Other
Normal gait / station
Other
Other
Normal gait / station

## 2021-05-03 NOTE — BH INPATIENT PSYCHIATRY PROGRESS NOTE - NSBHFUPINTERVALHXFT_PSY_A_CORE
Patient is followed up for bipolar Disorder, admitted for psychosis in context of noncompliance with medications and substance use. Chart, medications and labs reviewed. Patient was discussed with nursing staff. No overnight events reported and no PRN medication required for agitation. He was visible on the unit and cooperative. He slept well last night and his appetite is adequate. He denied perceptual disturbances such as AH, VH, TH. He denied current SI/ HI, intent and plan. He was compliant with his medication regimen and denied side effects. Discharge planning/ safety planning and outpatient treatment discussed with the patient and he verbalized understanding. Patient accompanied home by his mother.

## 2021-05-03 NOTE — BH INPATIENT PSYCHIATRY PROGRESS NOTE - NSDCCRITERIA_PSY_ALL_CORE
Sx reduction, medication management, safety planning, milieu therapy, outpatient psychiatric care.  
Symptom stabilization with improved level of functioning  CGI<=2
Sx reduction, medication management, safety planning, milieu therapy, outpatient psychiatric care.  
Symptom stabilization with improved level of functioning  CGI<=2
Sx reduction, medication management, safety planning, milieu therapy, outpatient psychiatric care.  

## 2021-05-03 NOTE — BH INPATIENT PSYCHIATRY PROGRESS NOTE - NSTXMEDICINTERMD_PSY_ALL_CORE
Will offer patient ROSE.

## 2021-05-03 NOTE — BH INPATIENT PSYCHIATRY PROGRESS NOTE - NSBHMSEBODY_PSY_A_CORE
Average build

## 2021-05-03 NOTE — BH INPATIENT PSYCHIATRY PROGRESS NOTE - LEVEL OF CONSCIOUSNESS
Alert
Lethargic, arousable to verbal stimulus
Lethargic, arousable to verbal stimulus
Alert
Lethargic, arousable to verbal stimulus
Alert

## 2021-05-03 NOTE — BH INPATIENT PSYCHIATRY PROGRESS NOTE - NSBHMSEIMPULSE_PSY_A_CORE
Impaired
Normal
Impaired
Normal
Normal
Impaired
Normal
Impaired
Normal
Impaired

## 2021-05-03 NOTE — BH INPATIENT PSYCHIATRY DISCHARGE NOTE - OTHER PAST PSYCHIATRIC HISTORY (INCLUDE DETAILS REGARDING ONSET, COURSE OF ILLNESS, INPATIENT/OUTPATIENT TREATMENT)
Pt is a 21 y/o -American male, single, non-caregiver, lives with parents and 2 siblings, diagnosed wit Bipolar Disorder with psychosis, hx of cannabis and K2 abuse, 3 past psych hospitalizations, first in 2014 at [facility] for psychosis and most recently at [facility] from 10/19/19-11/5/19, no current outpatient provider, no history of self-injurious behavior or suicide attempts, no h/o violence or legal issues, no PMH, brought in by cousin for not sleeping, paranoia, grandiosity, and disorganized thought process in context of medication noncompliance and likely ongoing cannabis and k2 use.

## 2021-05-03 NOTE — BH INPATIENT PSYCHIATRY DISCHARGE NOTE - NSDCCPCAREPLAN_GEN_ALL_CORE_FT
PRINCIPAL DISCHARGE DIAGNOSIS  Diagnosis: Bipolar disorder  Assessment and Plan of Treatment:        PRINCIPAL DISCHARGE DIAGNOSIS  Diagnosis: Bipolar disorder  Assessment and Plan of Treatment: Outpatient treatment.      SECONDARY DISCHARGE DIAGNOSES  Diagnosis: Substance abuse  Assessment and Plan of Treatment: Patient refused treatment.

## 2021-05-03 NOTE — BH INPATIENT PSYCHIATRY PROGRESS NOTE - NSCGIIMPROVESX_PSY_ALL_CORE
3 = Minimally improved - slightly better with little or no clinically meaningful reduction of symptoms.  Represents very little change in basic clinical status, level of care, or functional capacity.
2 = Much improved - notably better with signficant reduction of symptoms; increase in the level of functioning but some symptoms remain
3 = Minimally improved - slightly better with little or no clinically meaningful reduction of symptoms.  Represents very little change in basic clinical status, level of care, or functional capacity.

## 2021-05-03 NOTE — BH INPATIENT PSYCHIATRY DISCHARGE NOTE - NSBHDCMEDICALFT_PSY_A_CORE
NA Patient reported a fall Patient reported a fall in the bathroom on 4/22 (unwitnessed). He was seen by the medical MD and was treated with Tylenol and cool compress. No physical injuries noted. Patient c/o bump on his left hand. Patient also reported using his hands to punch walls prior to his hospitalization. No c/o hand pain since.

## 2021-05-03 NOTE — BH INPATIENT PSYCHIATRY PROGRESS NOTE - NSBHMSEBEHAV_PSY_A_CORE
Cooperative

## 2021-05-03 NOTE — BH INPATIENT PSYCHIATRY PROGRESS NOTE - NSBHMSETHTPROC_PSY_A_CORE
Disorganized
Disorganized/Tangential/Thought blocking/Impaired reasoning/Other
Linear/Thought blocking/Impaired reasoning/Other
Disorganized
Linear/Thought blocking/Impaired reasoning/Other
Linear/Thought blocking/Impaired reasoning/Other
Disorganized/Thought blocking/Impaired reasoning
Disorganized/Tangential/Thought blocking/Impaired reasoning
Disorganized
Disorganized/Tangential/Thought blocking/Impaired reasoning
Disorganized/Tangential/Thought blocking/Impaired reasoning/Other
Linear/Thought blocking/Impaired reasoning/Other
Tangential/Thought blocking/Impaired reasoning/Other
Linear/Thought blocking/Impaired reasoning/Other
Disorganized/Tangential/Thought blocking/Impaired reasoning/Other

## 2021-05-03 NOTE — BH INPATIENT PSYCHIATRY PROGRESS NOTE - NSTXDCOPLKDATEEST_PSY_ALL_CORE
20-Apr-2021
12-Apr-2021
20-Apr-2021
12-Apr-2021
20-Apr-2021
12-Apr-2021
20-Apr-2021
20-Apr-2021
12-Apr-2021
20-Apr-2021
12-Apr-2021
12-Apr-2021
20-Apr-2021
12-Apr-2021

## 2021-05-03 NOTE — BH INPATIENT PSYCHIATRY PROGRESS NOTE - NSBHCONTPROVIDER_PSY_ALL_CORE
No...
Not applicable
No...
Not applicable
No...
Not applicable

## 2021-05-03 NOTE — BH INPATIENT PSYCHIATRY PROGRESS NOTE - NSBHMSEAFFCONG_PSY_A_CORE
Congruent
Non-congruent
Congruent
Non-congruent
Congruent
Congruent
Non-congruent
Non-congruent

## 2021-05-03 NOTE — BH INPATIENT PSYCHIATRY PROGRESS NOTE - NSBHMSEKNOWHOW_PSY_ALL_CORE
Vocabulary

## 2021-05-03 NOTE — BH INPATIENT PSYCHIATRY PROGRESS NOTE - NSTXMEDICDATETRGT_PSY_ALL_CORE
27-Apr-2021
17-Apr-2021
01-May-2021
17-Apr-2021
21-Apr-2021
17-Apr-2021
03-May-2021
27-Apr-2021
17-Apr-2021
05-May-2021
01-May-2021
21-Apr-2021
21-Apr-2021
05-May-2021
05-May-2021
27-Apr-2021
17-Apr-2021

## 2021-05-03 NOTE — BH INPATIENT PSYCHIATRY DISCHARGE NOTE - HPI (INCLUDE ILLNESS QUALITY, SEVERITY, DURATION, TIMING, CONTEXT, MODIFYING FACTORS, ASSOCIATED SIGNS AND SYMPTOMS)
Patient was seen and evaluated, chart reviewed. Case discussed with nursing team.  On service for this 20 yr odl AA male with PPH of Bipolar Disorder.  Patient is hospitalized with a primary problem of psychotic decompensation in context on noncompliance with medications and polysubstance abuse.  Patient admitted to [facility] on  legal status. I have reviewed the initial psychiatric assessment in the electronic medical record, including the history of present illness, past psychiatric history, family/social history (no pertinent changes), and exam, and have confirmed the salient findings dated 21.  As per chart review, transferring records indicated the followin y/o -American male, single, noncaregiver, lives with parents and 2 siblings, charted diagnoses of Bipolar Disorder with psychosis, history of cannabis and K2 abuse, 3 past psych hospitalizations, first in  at [facility] for psychosis and most recently at [address] from 10/19/19-19, no current outpatient provider, noncompliant with psychiatric treatment since [facility] discharge, no history of self-injurious behavior or suicide attempts, no h/o violence or legal issues, no PMH, +regular cannabis and K2 use, brought in by cousin for not sleeping, paranoia, grandiosity, and disorganized thought process in context of medication noncompliance and likely ongoing cannabis and k2 use.  On arrival to  ED, pt was very agitated, restless and difficult to interview.  Patient was interviewed and observed in triage.  On assessment, pt has a grossly disorganized thought process. Patient is noted to be hyper and seen talking to himself and continues addressing his cousin and laughing to himself. He was mumbling and it was difficult to understand what he was saying but said that he came to [address] [place] [address] to get help like last time and he may need it again but then started laughing more to himself. He started talking about the clothing he was wearing and said he had to change. Pt was wearing multiple gold chains around his neck. He then proceeded to compliment writer's hair and show his tattoos and ask if she knew what it meant.   Patient has not been sleeping and been not taking meds notably was exhibiting ideas of reference and grandiose delusions. Patient is acutely agitated on presentation and is exhibiting racing thoughts and states he's been having violent thoughts and flight of ideas not able to linearly participate in interview.  He denies AH/VH but appears to be internally preoccupied. He denies SI/HI.  He denies depression. Pt is reported to have been using marijuana but unsure of frequency, and then started laughing saying "it was called grass, green, marijuana" and exhibiting gestures on how to smoke marijuana. Patient is acutely manic, labile with agitation and unable to maintain behavioral control.  While patient was changing he pulled back the curtain and started making sexual comments towards writer and required redirection.     On unit:  Information Received From: Chart review and patient interview  Patient is followed up for Bipolar Disorder, admitted for psychosis in context of noncompliance with medications.  Chart, medications and labs reviewed.  Patient is discussed with nursing staff. No significant overnight issues. Patient also presents with substance abuse, regular cannabis and K2 use.  Patient unsure of frequency/amount of use,  u-tox +cannabis. Patient is observed pacing hallway, appeared sedated, with unsteady gait.  Patient is escorted back to room with nursing staff and writer.  Patient is notably disorganized, appears paranoid, very guarded, suspicious of writer.   When questioned about his mood and psychotic symptoms, there is some latency. Interview was challenging due to patient’s profound disorganization.  Patient’s story came in fits and fragments, difficult to follow, then would close eyes intermittently, appeared to be asleep. Patient is profoundly disorganized with ongoing perceptual disturbances, exhibiting ideas of reference and grandiose delusions, racing thoughts. He denies SI/HI, denies all psychotic features, despite psychotic symptoms successfully elicited.  At times mumbling to self, talking to self, laughing inappropriately to self during interview.  Unable to fully participate in meaningful interview due to severity of psychotic/disorganized and paranoid symptoms. Patient has not slept in several days prior to admission. Per nursing patient received po prn medications Haldol 5mg Ativan 2mg and Benadryl 50mg, able to sleep last night throughout the night, no appetite concerns.  Remains compliant with medications on unit.  Per chart review patient has potential to be aggressive. Has not had any behavioral issues on unit requiring IM medications.  Continue to provide therapeutic support.

## 2021-05-03 NOTE — BH INPATIENT PSYCHIATRY PROGRESS NOTE - NSBHMSEINTELL_PSY_A_CORE
Average
Unable to assess
Average
Unable to assess
Average
Unable to assess
Average

## 2021-05-03 NOTE — BH INPATIENT PSYCHIATRY PROGRESS NOTE - NSICDXBHPRIMARYDX_PSY_ALL_CORE
Bipolar disorder   F31.9  

## 2021-05-03 NOTE — BH INPATIENT PSYCHIATRY PROGRESS NOTE - NSTXDISORGGOAL_PSY_ALL_CORE
Will make at least 3 goal and reality oriented statements during therapy
Will make at least 3 goal and reality oriented statements during therapy
Will demonstrate related thoughts for 5 min in conversation
Will make at least 3 goal and reality oriented statements during therapy
Will demonstrate related thoughts for 5 min in conversation
Will make at least 3 goal and reality oriented statements during therapy
Will verbalize 1 strategy to successfully cope with disturbed thinking
Will make at least 3 goal and reality oriented statements during therapy
Will make at least 3 goal and reality oriented statements during therapy
Will verbalize 1 strategy to successfully cope with disturbed thinking
Will verbalize 1 strategy to successfully cope with disturbed thinking
Will make at least 3 goal and reality oriented statements during therapy
Will make at least 3 goal and reality oriented statements during therapy
Will demonstrate related thoughts for 5 min in conversation
Will make at least 3 goal and reality oriented statements during therapy

## 2021-05-03 NOTE — BH INPATIENT PSYCHIATRY PROGRESS NOTE - NSBHMETABOLICLABS_PSY_ALL_CORE
Labs within last 12 months

## 2021-05-03 NOTE — BH INPATIENT PSYCHIATRY PROGRESS NOTE - NSBHMETABOLIC_PSY_ALL_CORE_FT
BMI: BMI (kg/m2): 31.5 (04-17-21 @ 17:26)  HbA1c: A1C with Estimated Average Glucose Result: 4.8 % (04-10-21 @ 11:45)    Glucose:   BP: 100/62 (04-29-21 @ 08:24) (100/62 - 133/75)  Lipid Panel: Date/Time: 04-10-21 @ 11:45  Cholesterol, Serum: 160  Direct LDL: --  HDL Cholesterol, Serum: 37  Total Cholesterol/HDL Ration Measurement: --  Triglycerides, Serum: 86  
BMI:   HbA1c: A1C with Estimated Average Glucose Result: 4.8 % (04-10-21 @ 11:45)    Glucose:   BP: 138/62 (04-15-21 @ 08:16) (138/62 - 138/62)  Lipid Panel: Date/Time: 04-10-21 @ 11:45  Cholesterol, Serum: 160  Direct LDL: --  HDL Cholesterol, Serum: 37  Total Cholesterol/HDL Ration Measurement: --  Triglycerides, Serum: 86  
BMI:   HbA1c: A1C with Estimated Average Glucose Result: 4.8 % (04-10-21 @ 11:45)    Glucose:   BP: 124/92 (04-12-21 @ 08:04) (124/92 - 149/94)  Lipid Panel: Date/Time: 04-10-21 @ 11:45  Cholesterol, Serum: 160  Direct LDL: --  HDL Cholesterol, Serum: 37  Total Cholesterol/HDL Ration Measurement: --  Triglycerides, Serum: 86  
BMI: BMI (kg/m2): 31.5 (04-17-21 @ 17:26)  HbA1c: A1C with Estimated Average Glucose Result: 4.8 % (04-10-21 @ 11:45)    Glucose:   BP: --  Lipid Panel: Date/Time: 04-10-21 @ 11:45  Cholesterol, Serum: 160  Direct LDL: --  HDL Cholesterol, Serum: 37  Total Cholesterol/HDL Ration Measurement: --  Triglycerides, Serum: 86  
BMI: BMI (kg/m2): 31.5 (04-17-21 @ 17:26)  HbA1c: A1C with Estimated Average Glucose Result: 4.8 % (04-10-21 @ 11:45)    Glucose:   BP: 116/67 (04-26-21 @ 08:04) (116/67 - 116/67)  Lipid Panel: Date/Time: 04-10-21 @ 11:45  Cholesterol, Serum: 160  Direct LDL: --  HDL Cholesterol, Serum: 37  Total Cholesterol/HDL Ration Measurement: --  Triglycerides, Serum: 86  
BMI:   HbA1c: A1C with Estimated Average Glucose Result: 4.8 % (04-10-21 @ 11:45)    Glucose:   BP: 138/62 (04-15-21 @ 08:16) (138/62 - 138/62)  Lipid Panel: Date/Time: 04-10-21 @ 11:45  Cholesterol, Serum: 160  Direct LDL: --  HDL Cholesterol, Serum: 37  Total Cholesterol/HDL Ration Measurement: --  Triglycerides, Serum: 86  
BMI:   HbA1c: A1C with Estimated Average Glucose Result: 4.8 % (04-10-21 @ 11:45)    Glucose:   BP: 124/92 (04-12-21 @ 08:04) (124/92 - 124/92)  Lipid Panel: Date/Time: 04-10-21 @ 11:45  Cholesterol, Serum: 160  Direct LDL: --  HDL Cholesterol, Serum: 37  Total Cholesterol/HDL Ration Measurement: --  Triglycerides, Serum: 86  
BMI: BMI (kg/m2): 31.5 (04-17-21 @ 17:26)  HbA1c: A1C with Estimated Average Glucose Result: 4.8 % (04-10-21 @ 11:45)    Glucose:   BP: 116/67 (04-26-21 @ 08:04) (116/67 - 116/67)  Lipid Panel: Date/Time: 04-10-21 @ 11:45  Cholesterol, Serum: 160  Direct LDL: --  HDL Cholesterol, Serum: 37  Total Cholesterol/HDL Ration Measurement: --  Triglycerides, Serum: 86  
BMI: BMI (kg/m2): 31.5 (04-17-21 @ 17:26)  HbA1c: A1C with Estimated Average Glucose Result: 4.8 % (04-10-21 @ 11:45)    Glucose:   BP: 107/58 (05-02-21 @ 07:47) (107/58 - 132/70)  Lipid Panel: Date/Time: 04-10-21 @ 11:45  Cholesterol, Serum: 160  Direct LDL: --  HDL Cholesterol, Serum: 37  Total Cholesterol/HDL Ration Measurement: --  Triglycerides, Serum: 86  
BMI: BMI (kg/m2): 31.5 (04-17-21 @ 17:26)  HbA1c: A1C with Estimated Average Glucose Result: 4.8 % (04-10-21 @ 11:45)    Glucose:   BP: 133/65 (04-18-21 @ 08:33) (131/70 - 133/65)  Lipid Panel: Date/Time: 04-10-21 @ 11:45  Cholesterol, Serum: 160  Direct LDL: --  HDL Cholesterol, Serum: 37  Total Cholesterol/HDL Ration Measurement: --  Triglycerides, Serum: 86  
BMI: BMI (kg/m2): 31.5 (04-17-21 @ 17:26)  HbA1c: A1C with Estimated Average Glucose Result: 4.8 % (04-10-21 @ 11:45)    Glucose:   BP: 133/75 (04-28-21 @ 07:53) (116/67 - 133/75)  Lipid Panel: Date/Time: 04-10-21 @ 11:45  Cholesterol, Serum: 160  Direct LDL: --  HDL Cholesterol, Serum: 37  Total Cholesterol/HDL Ration Measurement: --  Triglycerides, Serum: 86  
BMI:   HbA1c: A1C with Estimated Average Glucose Result: 4.8 % (04-10-21 @ 11:45)    Glucose:   BP: 149/94 (04-09-21 @ 20:59) (148/89 - 164/100)  Lipid Panel: Date/Time: 04-10-21 @ 11:45  Cholesterol, Serum: 160  Direct LDL: --  HDL Cholesterol, Serum: 37  Total Cholesterol/HDL Ration Measurement: --  Triglycerides, Serum: 86  
BMI:   HbA1c: A1C with Estimated Average Glucose Result: 4.8 % (04-10-21 @ 11:45)    Glucose:   BP: 124/92 (04-12-21 @ 08:04) (124/92 - 124/92)  Lipid Panel: Date/Time: 04-10-21 @ 11:45  Cholesterol, Serum: 160  Direct LDL: --  HDL Cholesterol, Serum: 37  Total Cholesterol/HDL Ration Measurement: --  Triglycerides, Serum: 86  
BMI: BMI (kg/m2): 31.5 (04-17-21 @ 17:26)  HbA1c: A1C with Estimated Average Glucose Result: 4.8 % (04-10-21 @ 11:45)    Glucose:   BP: 100/62 (04-29-21 @ 08:24) (100/62 - 133/75)  Lipid Panel: Date/Time: 04-10-21 @ 11:45  Cholesterol, Serum: 160  Direct LDL: --  HDL Cholesterol, Serum: 37  Total Cholesterol/HDL Ration Measurement: --  Triglycerides, Serum: 86  
BMI: BMI (kg/m2): 31.5 (04-17-21 @ 17:26)  HbA1c: A1C with Estimated Average Glucose Result: 4.8 % (04-10-21 @ 11:45)    Glucose:   BP: --  Lipid Panel: Date/Time: 04-10-21 @ 11:45  Cholesterol, Serum: 160  Direct LDL: --  HDL Cholesterol, Serum: 37  Total Cholesterol/HDL Ration Measurement: --  Triglycerides, Serum: 86  
BMI: BMI (kg/m2): 31.5 (04-17-21 @ 17:26)  HbA1c: A1C with Estimated Average Glucose Result: 4.8 % (04-10-21 @ 11:45)    Glucose:   BP: 133/65 (04-18-21 @ 08:33) (133/65 - 133/65)  Lipid Panel: Date/Time: 04-10-21 @ 11:45  Cholesterol, Serum: 160  Direct LDL: --  HDL Cholesterol, Serum: 37  Total Cholesterol/HDL Ration Measurement: --  Triglycerides, Serum: 86  
BMI: BMI (kg/m2): 31.5 (04-17-21 @ 17:26)  HbA1c: A1C with Estimated Average Glucose Result: 4.8 % (04-10-21 @ 11:45)    Glucose:   BP: --  Lipid Panel: Date/Time: 04-10-21 @ 11:45  Cholesterol, Serum: 160  Direct LDL: --  HDL Cholesterol, Serum: 37  Total Cholesterol/HDL Ration Measurement: --  Triglycerides, Serum: 86

## 2021-05-03 NOTE — BH INPATIENT PSYCHIATRY PROGRESS NOTE - NSBHMSEPERCEPT_PSY_A_CORE
No abnormalities/Other
No abnormalities
No abnormalities
No abnormalities/Other
Auditory hallucinations
No abnormalities
No abnormalities/Other

## 2021-05-03 NOTE — BH INPATIENT PSYCHIATRY PROGRESS NOTE - NSCGISEVERILLNESS_PSY_ALL_CORE
4 = Moderately ill – overt symptoms causing noticeable, but modest, functional impairment or distress; symptom level may warrant medication

## 2021-05-03 NOTE — BH INPATIENT PSYCHIATRY PROGRESS NOTE - NSTXDCOPLKINTERMD_PSY_ALL_CORE
Patient will be referred to outpatient services upon discharge.

## 2021-05-03 NOTE — BH INPATIENT PSYCHIATRY PROGRESS NOTE - NSTXMEDICDATENEW_PSY_ALL_CORE
24-Apr-2021

## 2021-05-03 NOTE — BH INPATIENT PSYCHIATRY PROGRESS NOTE - NSBHMSEAFFQUAL_PSY_A_CORE
Irritable/Other
Euthymic/Irritable/Other
Irritable/Other
Irritable/Other
Euthymic/Irritable/Other
Irritable
Irritable/Other
Irritable
Irritable/Other
Euthymic/Other
Irritable
Irritable/Other
Euthymic/Other

## 2021-05-03 NOTE — BH INPATIENT PSYCHIATRY PROGRESS NOTE - NSTXDCOPLKGOAL_PSY_ALL_CORE
Will agree to consider an appropriate level of outpatient care

## 2021-05-03 NOTE — BH INPATIENT PSYCHIATRY PROGRESS NOTE - NSTXMEDICPROGRES_PSY_ALL_CORE
Improving
Met - goal discontinued
Improving
Met - goal discontinued
Improving
No Change
No Change
Met - goal discontinued

## 2021-05-03 NOTE — BH INPATIENT PSYCHIATRY PROGRESS NOTE - NSBHMSEMOOD_PSY_A_CORE
Irritable
Normal
Irritable
Normal
Irritable

## 2021-05-03 NOTE — BH INPATIENT PSYCHIATRY PROGRESS NOTE - NSBHCONSULTIPREASON_PSY_A_CORE
other reason

## 2021-05-03 NOTE — BH INPATIENT PSYCHIATRY PROGRESS NOTE - NSTXDISORGINTERMD_PSY_ALL_CORE
Medication to target disorganization utilized.

## 2021-05-03 NOTE — BH INPATIENT PSYCHIATRY PROGRESS NOTE - NSTXDCOPLKDATETRGT_PSY_ALL_CORE
27-Apr-2021
26-Apr-2021
26-Apr-2021
03-May-2021
03-May-2021
27-Apr-2021
03-May-2021
19-Apr-2021
27-Apr-2021
19-Apr-2021
27-Apr-2021
03-May-2021
27-Apr-2021

## 2021-05-03 NOTE — BH INPATIENT PSYCHIATRY DISCHARGE NOTE - NSBHMETABOLIC_PSY_ALL_CORE_FT
BMI: BMI (kg/m2): 31.5 (04-17-21 @ 17:26)  HbA1c: A1C with Estimated Average Glucose Result: 4.8 % (04-10-21 @ 11:45)    Glucose:   BP: 107/58 (05-02-21 @ 07:47) (107/58 - 132/70)  Lipid Panel: Date/Time: 04-10-21 @ 11:45  Cholesterol, Serum: 160  Direct LDL: --  HDL Cholesterol, Serum: 37  Total Cholesterol/HDL Ration Measurement: --  Triglycerides, Serum: 86

## 2021-05-03 NOTE — BH INPATIENT PSYCHIATRY DISCHARGE NOTE - HOSPITAL COURSE
To be completed Hospitalization dates 4/9/21-4/3/21  ED note: “19 y/o -American male, single, non-caregiver, lives with parents and 2 siblings, charted diagnoses of Bipolar Disorder with psychosis, history of cannabis and K2 abuse, 3 past psych hospitalizations, first in 2014 at [facility] for psychosis and most recently at [facility] from 10/19/19-11/5/19, no current outpatient provider, noncompliant with psychiatric treatment since [facility] discharge, no history of self-injurious behavior or suicide attempts, no h/o violence or legal issues, no PMH, +regular cannabis and K2 use, brought in by cousin for not sleeping, paranoia, grandiosity, and disorganized thought process in context of medication noncompliance and likely ongoing cannabis and k2 use.  On arrival to  ED, pt was very agitated, restless and difficult to interview”.   Immediate risk was minimized by inpatient admission to a safe environment with appropriate supervision. As per ED note, “pt has a grossly disorganized thought process. Patient is noted to be hyper and seen talking to himself and continues addressing his cousin and laughing to himself. He was mumbling and it was difficult to understand what he was saying but said that he came to [address] [place] [address] to get help like last time and he may need it again but then started laughing more to himself. He started talking about the clothing he was wearing and said he had to change. Pt was wearing multiple gold chains around his neck. He then proceeded to compliment writer's hair and show his tattoos and ask if she knew what it meant.   Patient has not been sleeping and been not taking meds notably was exhibiting ideas of reference and grandiose delusions. Patient is acutely agitated on presentation and is exhibiting racing thoughts and states he's been having violent thoughts and flight of ideas not able to linearly participate in interview.  He denies AH/VH but appears to be internally preoccupied. He denies SI/HI.  He denies depression. Pt is reported to have been using marijuana but unsure of frequency, and then started laughing saying "it was called grass, green, marijuana" and exhibiting gestures on how to smoke marijuana. Patient is acutely manic, labile with agitation and unable to maintain behavioral control.  While patient was changing he pulled back the curtain and started making sexual comments towards writer and required redirection”.  Behavior: Initially on the unit, the patient was observed pacing hallway, and in dayroom.  He was cooperative but persistently manic, hyperverbal, disorganized, internally preoccupied, paranoid, grandiose, easily distracted, tangential, hypersexual,( "All I want to do is F***", drawing pornographic pictures). He denied SI/ HI, intent and plan. The patient was medication seeking for Adderall. He noted fair sleep and appetite. He was compliant with his medication regimen and denied side effects. He remained at risk for aggression. The patient was medicated with PO PRN medication for agitation while inpatient. From 4/20 leading up to discharge, the patient did not require PRN medication for agitation and he was able to maintain fair behavioral control. No restraints or seclusion room required while inpatient. Overtime with treatment, moderate improvement noted with the above symptoms. His insight into his illness improved fairly. He denied SI/ HI, intent and plan. Denied perceptual disturbances. Internally preoccupied at times. His sleep and appetite were adequate. He was compliant with his medication regimen and denied side effects including symptoms of hyperprolactinemia.   Tx: Risperdal 1mg po twice a day was started and titrated up to 2mg po daily and 4mg po at bedtime. Depakote ER 500mg po twice a day was started and increased to 500mg po daily and 1500mg po at bedtime. Last valproate acid level was 82.6 on 4/29 with current dose. Clonazepam 1mg po twice a day was started for nando. Overtime with symptom improvement, Clonazepam was decreased to 0.5mg po twice a day (Recommend to taper and discontinue). Benztropine 0.5mg twice a day ordered for akathisia ppx. The patient tolerated this medication regimen well and denied side effects including hyperprolactinemia. The patient was offered long acting injectable Invega Sustenna/ Risperdal consta which he refused.  Psychotherapy (group and individual therapy) offered while inpatient. Substance abuse treatment (outpatient for marijuana/ k2) offered to patient and he refused. Reduction/ cessation of substance use discussed with patient.   Discharge medication: 2 weeks supply was prescribed.  -benztropine 0.5 mg oral tablet 1 tab(s) orally 2 times a day.    -clonazePAM 0.5 mg oral tablet 1 tab(s) orally 2 times a day MDD:1mg. 7 days supply.   -Depakote ER 500mg po daily and 1500mg po at bedtime.   -RisperDAL 2 mg po daily and 4mg po at bedtime.     On discharge: Patient was followed up for bipolar disorder, admitted for psychosis in context of noncompliance with medications and substance use. Chart, medications and labs reviewed. Patient was discussed with nursing staff. No overnight events reported and no PRN medication required for agitation. He was visible on the unit and cooperative. He slept well last night and his appetite was adequate. He denied perceptual disturbances such as AH, VH, TH. He denied current SI/ HI, intent and plan. He was compliant with his medication regimen and denied side effects. Discharge planning/ safety planning and outpatient treatment discussed with the patient and he verbalized understanding. Patient accompanied home by his mother.  MSE: Level of Consciousness-Alert, General Appearance-No deformities present, Body Habitus-Average build, Hygiene-Good, Grooming-Good, Behavior-Cooperative, Eye Contact-Fair, Relatedness-Fair, Impulse Control	-Normal, Muscle Tone/Strength-Normal muscle tone/strength, Abnormal Movements-No abnormal movements, Gait/Station-Normal gait / station, Speech-Normal volume, rate, productivity, spontaneity and articulation, Mood-Normal, Affect Quality-Euthymic, Bizarre, odd at times, Affect Range-Full, Constricted, Affect Congruence-Congruent, Thought Process-Linear, Thought blocking, Impaired reasoning, Improving thought process, Thought Associations-Normal, Thought Content-Unremarkable, Perceptions-No abnormalities, seen talking to self, appears internally preoccupied, Orientation-Oriented to time, place, person, situation, Attention/Concentration-Impaired, Estimated Intelligence-Average, Recent Memory-Impaired, Remote Memory-Impaired, Fund of Knowledge-Normal, How Fund of Knowledge Assessed-Vocabulary, Language-No abnormalities noted, Judgment-Fair, Insight-Fair.  Patient was provided with extensive psychoeducation on treatment options and motivational counseling targeting healthy lifestyle. Patient was instructed on actions for crisis situations, understood and agreed to follow instructions for handling crisis, including coming to ER or calling 911 should the patient or their family feel that they are in danger of hurting self or others. Patient also was given Suicide Prevention Lifeline number 1-671.311.2836 and provided with instructions on its use.   For further collateral information, please contact RICH Esparza at [phone] or [phone], email: [email or web address].

## 2021-05-03 NOTE — BH INPATIENT PSYCHIATRY PROGRESS NOTE - NSBHMSEKNOW_PSY_A_CORE
Normal
Unable to assess
Normal
Unable to assess
Normal
Unable to assess
Normal

## 2021-05-03 NOTE — BH INPATIENT PSYCHIATRY PROGRESS NOTE - CURRENT MEDICATION
MEDICATIONS  (STANDING):  benztropine 1 milliGRAM(s) Oral two times a day  clonazePAM  Tablet 1 milliGRAM(s) Oral two times a day  diVALproex ER 1500 milliGRAM(s) Oral at bedtime  diVALproex  milliGRAM(s) Oral <User Schedule>  risperiDONE  Disintegrating Tablet 4 milliGRAM(s) Oral at bedtime  risperiDONE  Disintegrating Tablet 2 milliGRAM(s) Oral daily    MEDICATIONS  (PRN):  diphenhydrAMINE 50 milliGRAM(s) Oral every 6 hours PRN eps ppx/agitation  diphenhydrAMINE   Injectable 50 milliGRAM(s) IntraMuscular once PRN eps ppx/severe agitation  haloperidol     Tablet 5 milliGRAM(s) Oral every 6 hours PRN agitation  haloperidol    Injectable 5 milliGRAM(s) IntraMuscular once PRN severe agitation  hydrOXYzine hydrochloride 25 milliGRAM(s) Oral every 6 hours PRN anxiety  ibuprofen  Tablet. 400 milliGRAM(s) Oral every 6 hours PRN Moderate Pain (4 - 6)  LORazepam     Tablet 2 milliGRAM(s) Oral every 6 hours PRN agitation  LORazepam   Injectable 2 milliGRAM(s) IntraMuscular once PRN agitation  traZODone 50 milliGRAM(s) Oral at bedtime PRN insomnia  
MEDICATIONS  (STANDING):  benztropine 1 milliGRAM(s) Oral two times a day  diVALproex  milliGRAM(s) Oral <User Schedule>  diVALproex ER 1000 milliGRAM(s) Oral at bedtime  risperiDONE  Disintegrating Tablet 3 milliGRAM(s) Oral at bedtime    MEDICATIONS  (PRN):  diphenhydrAMINE 50 milliGRAM(s) Oral every 6 hours PRN eps ppx/agitation  diphenhydrAMINE   Injectable 50 milliGRAM(s) IntraMuscular once PRN eps ppx/severe agitation  haloperidol     Tablet 5 milliGRAM(s) Oral every 6 hours PRN agitation  haloperidol    Injectable 5 milliGRAM(s) IntraMuscular once PRN severe agitation  hydrOXYzine hydrochloride 25 milliGRAM(s) Oral every 6 hours PRN anxiety  LORazepam     Tablet 2 milliGRAM(s) Oral every 6 hours PRN agitation  LORazepam   Injectable 2 milliGRAM(s) IntraMuscular once PRN severe agitation  traZODone 50 milliGRAM(s) Oral at bedtime PRN insomnia  
MEDICATIONS  (STANDING):  benztropine 1 milliGRAM(s) Oral two times a day  clonazePAM  Tablet 1 milliGRAM(s) Oral two times a day  diVALproex ER 1500 milliGRAM(s) Oral at bedtime  diVALproex  milliGRAM(s) Oral <User Schedule>  risperiDONE  Disintegrating Tablet 4 milliGRAM(s) Oral at bedtime  risperiDONE  Disintegrating Tablet 2 milliGRAM(s) Oral daily    MEDICATIONS  (PRN):  acetaminophen   Tablet .. 650 milliGRAM(s) Oral every 6 hours PRN Moderate Pain (4 - 6)  diphenhydrAMINE 50 milliGRAM(s) Oral every 6 hours PRN eps ppx/agitation  diphenhydrAMINE   Injectable 50 milliGRAM(s) IntraMuscular once PRN eps ppx/severe agitation  haloperidol     Tablet 5 milliGRAM(s) Oral every 6 hours PRN agitation  haloperidol    Injectable 5 milliGRAM(s) IntraMuscular once PRN severe agitation  hydrOXYzine hydrochloride 25 milliGRAM(s) Oral every 6 hours PRN anxiety  ibuprofen  Tablet. 400 milliGRAM(s) Oral every 6 hours PRN Moderate Pain (4 - 6)  LORazepam     Tablet 2 milliGRAM(s) Oral every 6 hours PRN agitation  LORazepam   Injectable 2 milliGRAM(s) IntraMuscular once PRN agitation  traZODone 50 milliGRAM(s) Oral at bedtime PRN insomnia  
MEDICATIONS  (STANDING):  benztropine 1 milliGRAM(s) Oral two times a day  diVALproex  milliGRAM(s) Oral <User Schedule>  diVALproex ER 1000 milliGRAM(s) Oral at bedtime  risperiDONE  Disintegrating Tablet 1 milliGRAM(s) Oral daily  risperiDONE  Disintegrating Tablet 3 milliGRAM(s) Oral at bedtime    MEDICATIONS  (PRN):  diphenhydrAMINE 50 milliGRAM(s) Oral every 6 hours PRN eps ppx/agitation  diphenhydrAMINE   Injectable 50 milliGRAM(s) IntraMuscular once PRN eps ppx/severe agitation  haloperidol     Tablet 5 milliGRAM(s) Oral every 6 hours PRN agitation  haloperidol    Injectable 5 milliGRAM(s) IntraMuscular once PRN severe agitation  hydrOXYzine hydrochloride 25 milliGRAM(s) Oral every 6 hours PRN anxiety  LORazepam     Tablet 2 milliGRAM(s) Oral every 6 hours PRN agitation  LORazepam   Injectable 2 milliGRAM(s) IntraMuscular once PRN severe agitation  traZODone 50 milliGRAM(s) Oral at bedtime PRN insomnia  
MEDICATIONS  (STANDING):  benztropine 1 milliGRAM(s) Oral two times a day  clonazePAM  Tablet 1 milliGRAM(s) Oral two times a day  diVALproex  milliGRAM(s) Oral <User Schedule>  diVALproex ER 1000 milliGRAM(s) Oral at bedtime  risperiDONE  Disintegrating Tablet 4 milliGRAM(s) Oral at bedtime  risperiDONE  Disintegrating Tablet 2 milliGRAM(s) Oral daily    MEDICATIONS  (PRN):  diphenhydrAMINE 50 milliGRAM(s) Oral every 6 hours PRN eps ppx/agitation  diphenhydrAMINE   Injectable 50 milliGRAM(s) IntraMuscular once PRN eps ppx/severe agitation  haloperidol     Tablet 5 milliGRAM(s) Oral every 6 hours PRN agitation  haloperidol    Injectable 5 milliGRAM(s) IntraMuscular once PRN severe agitation  hydrOXYzine hydrochloride 25 milliGRAM(s) Oral every 6 hours PRN anxiety  ibuprofen  Tablet. 400 milliGRAM(s) Oral every 6 hours PRN Moderate Pain (4 - 6)  LORazepam     Tablet 2 milliGRAM(s) Oral every 6 hours PRN agitation  LORazepam   Injectable 2 milliGRAM(s) IntraMuscular once PRN agitation  traZODone 50 milliGRAM(s) Oral at bedtime PRN insomnia  
MEDICATIONS  (STANDING):  benztropine 1 milliGRAM(s) Oral two times a day  clonazePAM  Tablet 1 milliGRAM(s) Oral two times a day  diVALproex  milliGRAM(s) Oral <User Schedule>  diVALproex ER 1000 milliGRAM(s) Oral at bedtime  risperiDONE  Disintegrating Tablet 4 milliGRAM(s) Oral at bedtime  risperiDONE  Disintegrating Tablet 2 milliGRAM(s) Oral daily    MEDICATIONS  (PRN):  diphenhydrAMINE 50 milliGRAM(s) Oral every 6 hours PRN eps ppx/agitation  diphenhydrAMINE   Injectable 50 milliGRAM(s) IntraMuscular once PRN eps ppx/severe agitation  haloperidol     Tablet 5 milliGRAM(s) Oral every 6 hours PRN agitation  haloperidol    Injectable 5 milliGRAM(s) IntraMuscular once PRN severe agitation  hydrOXYzine hydrochloride 25 milliGRAM(s) Oral every 6 hours PRN anxiety  ibuprofen  Tablet. 400 milliGRAM(s) Oral every 6 hours PRN Moderate Pain (4 - 6)  LORazepam     Tablet 2 milliGRAM(s) Oral every 6 hours PRN agitation  LORazepam   Injectable 2 milliGRAM(s) IntraMuscular once PRN agitation  traZODone 50 milliGRAM(s) Oral at bedtime PRN insomnia  
MEDICATIONS  (STANDING):  benztropine 0.5 milliGRAM(s) Oral two times a day  clonazePAM  Tablet 1 milliGRAM(s) Oral two times a day  diVALproex ER 1500 milliGRAM(s) Oral at bedtime  diVALproex  milliGRAM(s) Oral <User Schedule>  risperiDONE  Disintegrating Tablet 4 milliGRAM(s) Oral at bedtime  risperiDONE  Disintegrating Tablet 2 milliGRAM(s) Oral daily    MEDICATIONS  (PRN):  acetaminophen   Tablet .. 650 milliGRAM(s) Oral every 6 hours PRN Moderate Pain (4 - 6)  diphenhydrAMINE 50 milliGRAM(s) Oral every 6 hours PRN eps ppx/agitation  diphenhydrAMINE   Injectable 50 milliGRAM(s) IntraMuscular once PRN eps ppx/severe agitation  haloperidol     Tablet 5 milliGRAM(s) Oral every 6 hours PRN agitation  haloperidol    Injectable 5 milliGRAM(s) IntraMuscular once PRN severe agitation  hydrOXYzine hydrochloride 25 milliGRAM(s) Oral every 6 hours PRN anxiety  ibuprofen  Tablet. 400 milliGRAM(s) Oral every 6 hours PRN Moderate Pain (4 - 6)  LORazepam     Tablet 2 milliGRAM(s) Oral every 6 hours PRN agitation  LORazepam   Injectable 2 milliGRAM(s) IntraMuscular once PRN agitation  traZODone 50 milliGRAM(s) Oral at bedtime PRN insomnia  
MEDICATIONS  (STANDING):  benztropine 1 milliGRAM(s) Oral two times a day  risperiDONE  Disintegrating Tablet 1 milliGRAM(s) Oral two times a day    MEDICATIONS  (PRN):  diphenhydrAMINE 50 milliGRAM(s) Oral every 6 hours PRN eps ppx/agitation  diphenhydrAMINE   Injectable 50 milliGRAM(s) IntraMuscular once PRN eps ppx/severe agitation  diphenhydrAMINE   Injectable 50 milliGRAM(s) IntraMuscular once PRN eps ppx/severe agitation  haloperidol     Tablet 5 milliGRAM(s) Oral every 6 hours PRN agitation  haloperidol    Injectable 5 milliGRAM(s) IntraMuscular once PRN severe agitation  LORazepam     Tablet 2 milliGRAM(s) Oral every 6 hours PRN agitation  LORazepam   Injectable 2 milliGRAM(s) IntraMuscular once PRN severe agitation  
MEDICATIONS  (STANDING):  benztropine 1 milliGRAM(s) Oral two times a day  diVALproex  milliGRAM(s) Oral <User Schedule>  diVALproex ER 1000 milliGRAM(s) Oral at bedtime  risperiDONE  Disintegrating Tablet 1 milliGRAM(s) Oral daily  risperiDONE  Disintegrating Tablet 4 milliGRAM(s) Oral at bedtime    MEDICATIONS  (PRN):  diphenhydrAMINE 50 milliGRAM(s) Oral every 6 hours PRN eps ppx/agitation  diphenhydrAMINE   Injectable 50 milliGRAM(s) IntraMuscular once PRN eps ppx/severe agitation  haloperidol     Tablet 5 milliGRAM(s) Oral every 6 hours PRN agitation  haloperidol    Injectable 5 milliGRAM(s) IntraMuscular once PRN severe agitation  hydrOXYzine hydrochloride 25 milliGRAM(s) Oral every 6 hours PRN anxiety  ibuprofen  Tablet. 400 milliGRAM(s) Oral every 6 hours PRN Moderate Pain (4 - 6)  LORazepam     Tablet 2 milliGRAM(s) Oral every 6 hours PRN agitation  LORazepam   Injectable 2 milliGRAM(s) IntraMuscular once PRN severe agitation  traZODone 50 milliGRAM(s) Oral at bedtime PRN insomnia  
MEDICATIONS  (STANDING):  benztropine 1 milliGRAM(s) Oral two times a day  diVALproex  milliGRAM(s) Oral <User Schedule>  risperiDONE  Disintegrating Tablet 2 milliGRAM(s) Oral two times a day    MEDICATIONS  (PRN):  diphenhydrAMINE 50 milliGRAM(s) Oral every 6 hours PRN eps ppx/agitation  diphenhydrAMINE   Injectable 50 milliGRAM(s) IntraMuscular once PRN eps ppx/severe agitation  haloperidol     Tablet 5 milliGRAM(s) Oral every 6 hours PRN agitation  haloperidol    Injectable 5 milliGRAM(s) IntraMuscular once PRN severe agitation  hydrOXYzine hydrochloride 25 milliGRAM(s) Oral every 6 hours PRN anxiety  LORazepam     Tablet 2 milliGRAM(s) Oral every 6 hours PRN agitation  LORazepam   Injectable 2 milliGRAM(s) IntraMuscular once PRN severe agitation  traZODone 50 milliGRAM(s) Oral at bedtime PRN insomnia  
MEDICATIONS  (STANDING):  benztropine 0.5 milliGRAM(s) Oral two times a day  clonazePAM  Tablet 1 milliGRAM(s) Oral two times a day  diVALproex  milliGRAM(s) Oral <User Schedule>  diVALproex ER 1500 milliGRAM(s) Oral at bedtime  risperiDONE  Disintegrating Tablet 4 milliGRAM(s) Oral at bedtime  risperiDONE  Disintegrating Tablet 2 milliGRAM(s) Oral daily    MEDICATIONS  (PRN):  acetaminophen   Tablet .. 650 milliGRAM(s) Oral every 6 hours PRN Moderate Pain (4 - 6)  diphenhydrAMINE 50 milliGRAM(s) Oral every 6 hours PRN eps ppx/agitation  diphenhydrAMINE   Injectable 50 milliGRAM(s) IntraMuscular once PRN eps ppx/severe agitation  haloperidol     Tablet 5 milliGRAM(s) Oral every 6 hours PRN agitation  haloperidol    Injectable 5 milliGRAM(s) IntraMuscular once PRN severe agitation  hydrOXYzine hydrochloride 25 milliGRAM(s) Oral every 6 hours PRN anxiety  ibuprofen  Tablet. 400 milliGRAM(s) Oral every 6 hours PRN Moderate Pain (4 - 6)  LORazepam     Tablet 2 milliGRAM(s) Oral every 6 hours PRN agitation  LORazepam   Injectable 2 milliGRAM(s) IntraMuscular once PRN agitation  traZODone 50 milliGRAM(s) Oral at bedtime PRN insomnia  
MEDICATIONS  (STANDING):  benztropine 1 milliGRAM(s) Oral two times a day  diVALproex  milliGRAM(s) Oral <User Schedule>  diVALproex ER 1000 milliGRAM(s) Oral at bedtime  risperiDONE  Disintegrating Tablet 4 milliGRAM(s) Oral at bedtime    MEDICATIONS  (PRN):  diphenhydrAMINE 50 milliGRAM(s) Oral every 6 hours PRN eps ppx/agitation  diphenhydrAMINE   Injectable 50 milliGRAM(s) IntraMuscular once PRN eps ppx/severe agitation  haloperidol     Tablet 5 milliGRAM(s) Oral every 6 hours PRN agitation  haloperidol    Injectable 5 milliGRAM(s) IntraMuscular once PRN severe agitation  hydrOXYzine hydrochloride 25 milliGRAM(s) Oral every 6 hours PRN anxiety  ibuprofen  Tablet. 400 milliGRAM(s) Oral every 6 hours PRN Moderate Pain (4 - 6)  LORazepam     Tablet 2 milliGRAM(s) Oral every 6 hours PRN agitation  LORazepam   Injectable 2 milliGRAM(s) IntraMuscular once PRN severe agitation  traZODone 50 milliGRAM(s) Oral at bedtime PRN insomnia  
MEDICATIONS  (STANDING):  benztropine 0.5 milliGRAM(s) Oral two times a day  clonazePAM  Tablet 1 milliGRAM(s) Oral two times a day  diVALproex  milliGRAM(s) Oral <User Schedule>  diVALproex ER 1500 milliGRAM(s) Oral at bedtime  risperiDONE  Disintegrating Tablet 4 milliGRAM(s) Oral at bedtime  risperiDONE  Disintegrating Tablet 2 milliGRAM(s) Oral daily    MEDICATIONS  (PRN):  acetaminophen   Tablet .. 650 milliGRAM(s) Oral every 6 hours PRN Moderate Pain (4 - 6)  diphenhydrAMINE 50 milliGRAM(s) Oral every 6 hours PRN eps ppx/agitation  diphenhydrAMINE   Injectable 50 milliGRAM(s) IntraMuscular once PRN eps ppx/severe agitation  haloperidol     Tablet 5 milliGRAM(s) Oral every 6 hours PRN agitation  haloperidol    Injectable 5 milliGRAM(s) IntraMuscular once PRN severe agitation  hydrOXYzine hydrochloride 25 milliGRAM(s) Oral every 6 hours PRN anxiety  ibuprofen  Tablet. 400 milliGRAM(s) Oral every 6 hours PRN Moderate Pain (4 - 6)  LORazepam     Tablet 2 milliGRAM(s) Oral every 6 hours PRN agitation  LORazepam   Injectable 2 milliGRAM(s) IntraMuscular once PRN agitation  traZODone 50 milliGRAM(s) Oral at bedtime PRN insomnia  
MEDICATIONS  (STANDING):  benztropine 1 milliGRAM(s) Oral two times a day  clonazePAM  Tablet 0.5 milliGRAM(s) Oral two times a day  diVALproex  milliGRAM(s) Oral <User Schedule>  diVALproex ER 1000 milliGRAM(s) Oral at bedtime  risperiDONE  Disintegrating Tablet 4 milliGRAM(s) Oral at bedtime  risperiDONE  Disintegrating Tablet 2 milliGRAM(s) Oral daily    MEDICATIONS  (PRN):  diphenhydrAMINE 50 milliGRAM(s) Oral every 6 hours PRN eps ppx/agitation  diphenhydrAMINE   Injectable 50 milliGRAM(s) IntraMuscular once PRN eps ppx/severe agitation  haloperidol     Tablet 5 milliGRAM(s) Oral every 6 hours PRN agitation  haloperidol    Injectable 5 milliGRAM(s) IntraMuscular once PRN severe agitation  hydrOXYzine hydrochloride 25 milliGRAM(s) Oral every 6 hours PRN anxiety  ibuprofen  Tablet. 400 milliGRAM(s) Oral every 6 hours PRN Moderate Pain (4 - 6)  LORazepam     Tablet 2 milliGRAM(s) Oral every 6 hours PRN agitation  LORazepam   Injectable 2 milliGRAM(s) IntraMuscular once PRN agitation  traZODone 50 milliGRAM(s) Oral at bedtime PRN insomnia  
MEDICATIONS  (STANDING):  benztropine 0.5 milliGRAM(s) Oral two times a day  clonazePAM  Tablet 0.5 milliGRAM(s) Oral two times a day  diVALproex  milliGRAM(s) Oral <User Schedule>  diVALproex ER 1500 milliGRAM(s) Oral at bedtime  risperiDONE  Disintegrating Tablet 4 milliGRAM(s) Oral at bedtime  risperiDONE  Disintegrating Tablet 2 milliGRAM(s) Oral daily    MEDICATIONS  (PRN):  acetaminophen   Tablet .. 650 milliGRAM(s) Oral every 6 hours PRN Moderate Pain (4 - 6)  diphenhydrAMINE 50 milliGRAM(s) Oral every 6 hours PRN eps ppx/agitation  diphenhydrAMINE   Injectable 50 milliGRAM(s) IntraMuscular once PRN eps ppx/severe agitation  haloperidol     Tablet 5 milliGRAM(s) Oral every 6 hours PRN agitation  haloperidol    Injectable 5 milliGRAM(s) IntraMuscular once PRN severe agitation  hydrOXYzine hydrochloride 25 milliGRAM(s) Oral every 6 hours PRN anxiety  ibuprofen  Tablet. 400 milliGRAM(s) Oral every 6 hours PRN Moderate Pain (4 - 6)  LORazepam     Tablet 2 milliGRAM(s) Oral every 6 hours PRN agitation  LORazepam   Injectable 2 milliGRAM(s) IntraMuscular once PRN agitation  traZODone 50 milliGRAM(s) Oral at bedtime PRN insomnia  
MEDICATIONS  (STANDING):  benztropine 1 milliGRAM(s) Oral two times a day  clonazePAM  Tablet 0.5 milliGRAM(s) Oral two times a day  diVALproex  milliGRAM(s) Oral <User Schedule>  diVALproex ER 1000 milliGRAM(s) Oral at bedtime  risperiDONE  Disintegrating Tablet 4 milliGRAM(s) Oral at bedtime  risperiDONE  Disintegrating Tablet 2 milliGRAM(s) Oral daily    MEDICATIONS  (PRN):  diphenhydrAMINE 50 milliGRAM(s) Oral every 6 hours PRN eps ppx/agitation  diphenhydrAMINE   Injectable 50 milliGRAM(s) IntraMuscular once PRN eps ppx/severe agitation  haloperidol     Tablet 5 milliGRAM(s) Oral every 6 hours PRN agitation  haloperidol    Injectable 5 milliGRAM(s) IntraMuscular once PRN severe agitation  hydrOXYzine hydrochloride 25 milliGRAM(s) Oral every 6 hours PRN anxiety  ibuprofen  Tablet. 400 milliGRAM(s) Oral every 6 hours PRN Moderate Pain (4 - 6)  LORazepam     Tablet 2 milliGRAM(s) Oral every 6 hours PRN agitation  LORazepam   Injectable 2 milliGRAM(s) IntraMuscular once PRN severe agitation  traZODone 50 milliGRAM(s) Oral at bedtime PRN insomnia  
MEDICATIONS  (STANDING):  benztropine 1 milliGRAM(s) Oral two times a day  clonazePAM  Tablet 1 milliGRAM(s) Oral two times a day  diVALproex  milliGRAM(s) Oral <User Schedule>  diVALproex ER 1500 milliGRAM(s) Oral at bedtime  risperiDONE  Disintegrating Tablet 4 milliGRAM(s) Oral at bedtime  risperiDONE  Disintegrating Tablet 2 milliGRAM(s) Oral daily    MEDICATIONS  (PRN):  acetaminophen   Tablet .. 650 milliGRAM(s) Oral every 6 hours PRN Moderate Pain (4 - 6)  diphenhydrAMINE 50 milliGRAM(s) Oral every 6 hours PRN eps ppx/agitation  diphenhydrAMINE   Injectable 50 milliGRAM(s) IntraMuscular once PRN eps ppx/severe agitation  haloperidol     Tablet 5 milliGRAM(s) Oral every 6 hours PRN agitation  haloperidol    Injectable 5 milliGRAM(s) IntraMuscular once PRN severe agitation  hydrOXYzine hydrochloride 25 milliGRAM(s) Oral every 6 hours PRN anxiety  ibuprofen  Tablet. 400 milliGRAM(s) Oral every 6 hours PRN Moderate Pain (4 - 6)  LORazepam     Tablet 2 milliGRAM(s) Oral every 6 hours PRN agitation  LORazepam   Injectable 2 milliGRAM(s) IntraMuscular once PRN agitation  traZODone 50 milliGRAM(s) Oral at bedtime PRN insomnia

## 2021-05-03 NOTE — BH INPATIENT PSYCHIATRY PROGRESS NOTE - NSBHATTESTSEENBY_PSY_A_CORE
NP without Attending Psychiatrist

## 2021-05-03 NOTE — BH INPATIENT PSYCHIATRY DISCHARGE NOTE - ADULT OR CHILD PROTECTIVE SERVICES INVOLVEMENT
Bill For Surgical Tray: no Consent: Written consent was obtained and risks were reviewed including but not limited to scarring, infection, bleeding, scabbing, incomplete removal, nerve damage and allergy to anesthesia. Validate Note Data (See Information Below): Yes No Curettage Text: The wound bed was treated with curettage after the biopsy was performed. Hemostasis: Aluminum Chloride Wound Care: Vaseline Billing Type: Third-Party Bill Anesthesia Volume In Cc (Will Not Render If 0): 1 Notification Instructions: Patient will be notified of biopsy results. However, patient instructed to call the office if not contacted within 2 weeks. Information: Selecting Yes will display possible errors in your note based on the variables you have selected. This validation is only offered as a suggestion for you. PLEASE NOTE THAT THE VALIDATION TEXT WILL BE REMOVED WHEN YOU FINALIZE YOUR NOTE. IF YOU WANT TO FAX A PRELIMINARY NOTE YOU WILL NEED TO TOGGLE THIS TO 'NO' IF YOU DO NOT WANT IT IN YOUR FAXED NOTE. Silver Nitrate Text: The wound bed was treated with silver nitrate after the biopsy was performed. Size Of Lesion In Cm: 0 Electrodesiccation And Curettage Text: The wound bed was treated with electrodesiccation and curettage after the biopsy was performed. Detail Level: Simple Biopsy Method: Personna blade Type Of Destruction Used: Curettage Depth Of Biopsy: dermis Anesthesia Type: 1% lidocaine with 1:200,000 epinephrine Post-Care Instructions: I reviewed with the patient in detail post-care instructions. Patient is to keep the biopsy site dry overnight, and then apply bacitracin twice daily until healed. Patient may apply hydrogen peroxide soaks to remove any crusting. Cryotherapy Text: The wound bed was treated with cryotherapy after the biopsy was performed. Dressing: bandage Electrodesiccation Text: The wound bed was treated with electrodesiccation after the biopsy was performed. Biopsy Type: H and E

## 2021-05-03 NOTE — BH INPATIENT PSYCHIATRY PROGRESS NOTE - NSTXDISORGPROGRES_PSY_ALL_CORE
No Change
No Change
Improving
Met - goal discontinued
Improving
No Change
Improving

## 2021-05-03 NOTE — BH INPATIENT PSYCHIATRY PROGRESS NOTE - NSBHMSERECMEM_PSY_A_CORE
Impaired
Unable to assess
Impaired
Unable to assess
Unable to assess
Impaired
Impaired

## 2021-05-03 NOTE — BH INPATIENT PSYCHIATRY PROGRESS NOTE - PRN MEDS
MEDICATIONS  (PRN):  diphenhydrAMINE 50 milliGRAM(s) Oral every 6 hours PRN eps ppx/agitation  diphenhydrAMINE   Injectable 50 milliGRAM(s) IntraMuscular once PRN eps ppx/severe agitation  haloperidol     Tablet 5 milliGRAM(s) Oral every 6 hours PRN agitation  haloperidol    Injectable 5 milliGRAM(s) IntraMuscular once PRN severe agitation  hydrOXYzine hydrochloride 25 milliGRAM(s) Oral every 6 hours PRN anxiety  ibuprofen  Tablet. 400 milliGRAM(s) Oral every 6 hours PRN Moderate Pain (4 - 6)  LORazepam     Tablet 2 milliGRAM(s) Oral every 6 hours PRN agitation  LORazepam   Injectable 2 milliGRAM(s) IntraMuscular once PRN agitation  traZODone 50 milliGRAM(s) Oral at bedtime PRN insomnia  
MEDICATIONS  (PRN):  diphenhydrAMINE 50 milliGRAM(s) Oral every 6 hours PRN eps ppx/agitation  diphenhydrAMINE   Injectable 50 milliGRAM(s) IntraMuscular once PRN eps ppx/severe agitation  haloperidol     Tablet 5 milliGRAM(s) Oral every 6 hours PRN agitation  haloperidol    Injectable 5 milliGRAM(s) IntraMuscular once PRN severe agitation  hydrOXYzine hydrochloride 25 milliGRAM(s) Oral every 6 hours PRN anxiety  LORazepam     Tablet 2 milliGRAM(s) Oral every 6 hours PRN agitation  LORazepam   Injectable 2 milliGRAM(s) IntraMuscular once PRN severe agitation  traZODone 50 milliGRAM(s) Oral at bedtime PRN insomnia  
MEDICATIONS  (PRN):  acetaminophen   Tablet .. 650 milliGRAM(s) Oral every 6 hours PRN Moderate Pain (4 - 6)  diphenhydrAMINE 50 milliGRAM(s) Oral every 6 hours PRN eps ppx/agitation  diphenhydrAMINE   Injectable 50 milliGRAM(s) IntraMuscular once PRN eps ppx/severe agitation  haloperidol     Tablet 5 milliGRAM(s) Oral every 6 hours PRN agitation  haloperidol    Injectable 5 milliGRAM(s) IntraMuscular once PRN severe agitation  hydrOXYzine hydrochloride 25 milliGRAM(s) Oral every 6 hours PRN anxiety  ibuprofen  Tablet. 400 milliGRAM(s) Oral every 6 hours PRN Moderate Pain (4 - 6)  LORazepam     Tablet 2 milliGRAM(s) Oral every 6 hours PRN agitation  LORazepam   Injectable 2 milliGRAM(s) IntraMuscular once PRN agitation  traZODone 50 milliGRAM(s) Oral at bedtime PRN insomnia  
MEDICATIONS  (PRN):  diphenhydrAMINE 50 milliGRAM(s) Oral every 6 hours PRN eps ppx/agitation  diphenhydrAMINE   Injectable 50 milliGRAM(s) IntraMuscular once PRN eps ppx/severe agitation  haloperidol     Tablet 5 milliGRAM(s) Oral every 6 hours PRN agitation  haloperidol    Injectable 5 milliGRAM(s) IntraMuscular once PRN severe agitation  hydrOXYzine hydrochloride 25 milliGRAM(s) Oral every 6 hours PRN anxiety  ibuprofen  Tablet. 400 milliGRAM(s) Oral every 6 hours PRN Moderate Pain (4 - 6)  LORazepam     Tablet 2 milliGRAM(s) Oral every 6 hours PRN agitation  LORazepam   Injectable 2 milliGRAM(s) IntraMuscular once PRN severe agitation  traZODone 50 milliGRAM(s) Oral at bedtime PRN insomnia  
MEDICATIONS  (PRN):  acetaminophen   Tablet .. 650 milliGRAM(s) Oral every 6 hours PRN Moderate Pain (4 - 6)  diphenhydrAMINE 50 milliGRAM(s) Oral every 6 hours PRN eps ppx/agitation  diphenhydrAMINE   Injectable 50 milliGRAM(s) IntraMuscular once PRN eps ppx/severe agitation  haloperidol     Tablet 5 milliGRAM(s) Oral every 6 hours PRN agitation  haloperidol    Injectable 5 milliGRAM(s) IntraMuscular once PRN severe agitation  hydrOXYzine hydrochloride 25 milliGRAM(s) Oral every 6 hours PRN anxiety  ibuprofen  Tablet. 400 milliGRAM(s) Oral every 6 hours PRN Moderate Pain (4 - 6)  LORazepam     Tablet 2 milliGRAM(s) Oral every 6 hours PRN agitation  LORazepam   Injectable 2 milliGRAM(s) IntraMuscular once PRN agitation  traZODone 50 milliGRAM(s) Oral at bedtime PRN insomnia  
MEDICATIONS  (PRN):  diphenhydrAMINE 50 milliGRAM(s) Oral every 6 hours PRN eps ppx/agitation  diphenhydrAMINE   Injectable 50 milliGRAM(s) IntraMuscular once PRN eps ppx/severe agitation  haloperidol     Tablet 5 milliGRAM(s) Oral every 6 hours PRN agitation  haloperidol    Injectable 5 milliGRAM(s) IntraMuscular once PRN severe agitation  hydrOXYzine hydrochloride 25 milliGRAM(s) Oral every 6 hours PRN anxiety  ibuprofen  Tablet. 400 milliGRAM(s) Oral every 6 hours PRN Moderate Pain (4 - 6)  LORazepam     Tablet 2 milliGRAM(s) Oral every 6 hours PRN agitation  LORazepam   Injectable 2 milliGRAM(s) IntraMuscular once PRN severe agitation  traZODone 50 milliGRAM(s) Oral at bedtime PRN insomnia  
MEDICATIONS  (PRN):  diphenhydrAMINE 50 milliGRAM(s) Oral every 6 hours PRN eps ppx/agitation  diphenhydrAMINE   Injectable 50 milliGRAM(s) IntraMuscular once PRN eps ppx/severe agitation  haloperidol     Tablet 5 milliGRAM(s) Oral every 6 hours PRN agitation  haloperidol    Injectable 5 milliGRAM(s) IntraMuscular once PRN severe agitation  hydrOXYzine hydrochloride 25 milliGRAM(s) Oral every 6 hours PRN anxiety  LORazepam     Tablet 2 milliGRAM(s) Oral every 6 hours PRN agitation  LORazepam   Injectable 2 milliGRAM(s) IntraMuscular once PRN severe agitation  traZODone 50 milliGRAM(s) Oral at bedtime PRN insomnia  
MEDICATIONS  (PRN):  diphenhydrAMINE 50 milliGRAM(s) Oral every 6 hours PRN eps ppx/agitation  diphenhydrAMINE   Injectable 50 milliGRAM(s) IntraMuscular once PRN eps ppx/severe agitation  haloperidol     Tablet 5 milliGRAM(s) Oral every 6 hours PRN agitation  haloperidol    Injectable 5 milliGRAM(s) IntraMuscular once PRN severe agitation  hydrOXYzine hydrochloride 25 milliGRAM(s) Oral every 6 hours PRN anxiety  ibuprofen  Tablet. 400 milliGRAM(s) Oral every 6 hours PRN Moderate Pain (4 - 6)  LORazepam     Tablet 2 milliGRAM(s) Oral every 6 hours PRN agitation  LORazepam   Injectable 2 milliGRAM(s) IntraMuscular once PRN agitation  traZODone 50 milliGRAM(s) Oral at bedtime PRN insomnia  
MEDICATIONS  (PRN):  acetaminophen   Tablet .. 650 milliGRAM(s) Oral every 6 hours PRN Moderate Pain (4 - 6)  diphenhydrAMINE 50 milliGRAM(s) Oral every 6 hours PRN eps ppx/agitation  diphenhydrAMINE   Injectable 50 milliGRAM(s) IntraMuscular once PRN eps ppx/severe agitation  haloperidol     Tablet 5 milliGRAM(s) Oral every 6 hours PRN agitation  haloperidol    Injectable 5 milliGRAM(s) IntraMuscular once PRN severe agitation  hydrOXYzine hydrochloride 25 milliGRAM(s) Oral every 6 hours PRN anxiety  ibuprofen  Tablet. 400 milliGRAM(s) Oral every 6 hours PRN Moderate Pain (4 - 6)  LORazepam     Tablet 2 milliGRAM(s) Oral every 6 hours PRN agitation  LORazepam   Injectable 2 milliGRAM(s) IntraMuscular once PRN agitation  traZODone 50 milliGRAM(s) Oral at bedtime PRN insomnia  
MEDICATIONS  (PRN):  diphenhydrAMINE 50 milliGRAM(s) Oral every 6 hours PRN eps ppx/agitation  diphenhydrAMINE   Injectable 50 milliGRAM(s) IntraMuscular once PRN eps ppx/severe agitation  diphenhydrAMINE   Injectable 50 milliGRAM(s) IntraMuscular once PRN eps ppx/severe agitation  haloperidol     Tablet 5 milliGRAM(s) Oral every 6 hours PRN agitation  haloperidol    Injectable 5 milliGRAM(s) IntraMuscular once PRN severe agitation  LORazepam     Tablet 2 milliGRAM(s) Oral every 6 hours PRN agitation  LORazepam   Injectable 2 milliGRAM(s) IntraMuscular once PRN severe agitation  
MEDICATIONS  (PRN):  diphenhydrAMINE 50 milliGRAM(s) Oral every 6 hours PRN eps ppx/agitation  diphenhydrAMINE   Injectable 50 milliGRAM(s) IntraMuscular once PRN eps ppx/severe agitation  haloperidol     Tablet 5 milliGRAM(s) Oral every 6 hours PRN agitation  haloperidol    Injectable 5 milliGRAM(s) IntraMuscular once PRN severe agitation  hydrOXYzine hydrochloride 25 milliGRAM(s) Oral every 6 hours PRN anxiety  LORazepam     Tablet 2 milliGRAM(s) Oral every 6 hours PRN agitation  LORazepam   Injectable 2 milliGRAM(s) IntraMuscular once PRN severe agitation  traZODone 50 milliGRAM(s) Oral at bedtime PRN insomnia  
MEDICATIONS  (PRN):  acetaminophen   Tablet .. 650 milliGRAM(s) Oral every 6 hours PRN Moderate Pain (4 - 6)  diphenhydrAMINE 50 milliGRAM(s) Oral every 6 hours PRN eps ppx/agitation  diphenhydrAMINE   Injectable 50 milliGRAM(s) IntraMuscular once PRN eps ppx/severe agitation  haloperidol     Tablet 5 milliGRAM(s) Oral every 6 hours PRN agitation  haloperidol    Injectable 5 milliGRAM(s) IntraMuscular once PRN severe agitation  hydrOXYzine hydrochloride 25 milliGRAM(s) Oral every 6 hours PRN anxiety  ibuprofen  Tablet. 400 milliGRAM(s) Oral every 6 hours PRN Moderate Pain (4 - 6)  LORazepam     Tablet 2 milliGRAM(s) Oral every 6 hours PRN agitation  LORazepam   Injectable 2 milliGRAM(s) IntraMuscular once PRN agitation  traZODone 50 milliGRAM(s) Oral at bedtime PRN insomnia  
MEDICATIONS  (PRN):  acetaminophen   Tablet .. 650 milliGRAM(s) Oral every 6 hours PRN Moderate Pain (4 - 6)  diphenhydrAMINE 50 milliGRAM(s) Oral every 6 hours PRN eps ppx/agitation  diphenhydrAMINE   Injectable 50 milliGRAM(s) IntraMuscular once PRN eps ppx/severe agitation  haloperidol     Tablet 5 milliGRAM(s) Oral every 6 hours PRN agitation  haloperidol    Injectable 5 milliGRAM(s) IntraMuscular once PRN severe agitation  hydrOXYzine hydrochloride 25 milliGRAM(s) Oral every 6 hours PRN anxiety  ibuprofen  Tablet. 400 milliGRAM(s) Oral every 6 hours PRN Moderate Pain (4 - 6)  LORazepam     Tablet 2 milliGRAM(s) Oral every 6 hours PRN agitation  LORazepam   Injectable 2 milliGRAM(s) IntraMuscular once PRN agitation  traZODone 50 milliGRAM(s) Oral at bedtime PRN insomnia  
MEDICATIONS  (PRN):  diphenhydrAMINE 50 milliGRAM(s) Oral every 6 hours PRN eps ppx/agitation  diphenhydrAMINE   Injectable 50 milliGRAM(s) IntraMuscular once PRN eps ppx/severe agitation  haloperidol     Tablet 5 milliGRAM(s) Oral every 6 hours PRN agitation  haloperidol    Injectable 5 milliGRAM(s) IntraMuscular once PRN severe agitation  hydrOXYzine hydrochloride 25 milliGRAM(s) Oral every 6 hours PRN anxiety  ibuprofen  Tablet. 400 milliGRAM(s) Oral every 6 hours PRN Moderate Pain (4 - 6)  LORazepam     Tablet 2 milliGRAM(s) Oral every 6 hours PRN agitation  LORazepam   Injectable 2 milliGRAM(s) IntraMuscular once PRN severe agitation  traZODone 50 milliGRAM(s) Oral at bedtime PRN insomnia  
MEDICATIONS  (PRN):  diphenhydrAMINE 50 milliGRAM(s) Oral every 6 hours PRN eps ppx/agitation  diphenhydrAMINE   Injectable 50 milliGRAM(s) IntraMuscular once PRN eps ppx/severe agitation  haloperidol     Tablet 5 milliGRAM(s) Oral every 6 hours PRN agitation  haloperidol    Injectable 5 milliGRAM(s) IntraMuscular once PRN severe agitation  hydrOXYzine hydrochloride 25 milliGRAM(s) Oral every 6 hours PRN anxiety  ibuprofen  Tablet. 400 milliGRAM(s) Oral every 6 hours PRN Moderate Pain (4 - 6)  LORazepam     Tablet 2 milliGRAM(s) Oral every 6 hours PRN agitation  LORazepam   Injectable 2 milliGRAM(s) IntraMuscular once PRN agitation  traZODone 50 milliGRAM(s) Oral at bedtime PRN insomnia  
MEDICATIONS  (PRN):  diphenhydrAMINE 50 milliGRAM(s) Oral every 6 hours PRN eps ppx/agitation  diphenhydrAMINE   Injectable 50 milliGRAM(s) IntraMuscular once PRN eps ppx/severe agitation  haloperidol     Tablet 5 milliGRAM(s) Oral every 6 hours PRN agitation  haloperidol    Injectable 5 milliGRAM(s) IntraMuscular once PRN severe agitation  hydrOXYzine hydrochloride 25 milliGRAM(s) Oral every 6 hours PRN anxiety  ibuprofen  Tablet. 400 milliGRAM(s) Oral every 6 hours PRN Moderate Pain (4 - 6)  LORazepam     Tablet 2 milliGRAM(s) Oral every 6 hours PRN agitation  LORazepam   Injectable 2 milliGRAM(s) IntraMuscular once PRN agitation  traZODone 50 milliGRAM(s) Oral at bedtime PRN insomnia  
MEDICATIONS  (PRN):  acetaminophen   Tablet .. 650 milliGRAM(s) Oral every 6 hours PRN Moderate Pain (4 - 6)  diphenhydrAMINE 50 milliGRAM(s) Oral every 6 hours PRN eps ppx/agitation  diphenhydrAMINE   Injectable 50 milliGRAM(s) IntraMuscular once PRN eps ppx/severe agitation  haloperidol     Tablet 5 milliGRAM(s) Oral every 6 hours PRN agitation  haloperidol    Injectable 5 milliGRAM(s) IntraMuscular once PRN severe agitation  hydrOXYzine hydrochloride 25 milliGRAM(s) Oral every 6 hours PRN anxiety  ibuprofen  Tablet. 400 milliGRAM(s) Oral every 6 hours PRN Moderate Pain (4 - 6)  LORazepam     Tablet 2 milliGRAM(s) Oral every 6 hours PRN agitation  LORazepam   Injectable 2 milliGRAM(s) IntraMuscular once PRN agitation  traZODone 50 milliGRAM(s) Oral at bedtime PRN insomnia

## 2021-05-13 NOTE — SOCIAL WORK POST DISCHARGE FOLLOW UP NOTE - NSBHSWFOLLOWUP_PSY_ALL_CORE_FT
SW attempted to contact pt at [phone] to reschedule missed appointment at [facility] but phone was "unable to receive calls". At the time of dc the treatment team determined the patient was not a risk to themselves or others. This case is closed.

## 2021-06-23 ENCOUNTER — OUTPATIENT (OUTPATIENT)
Dept: OUTPATIENT SERVICES | Facility: HOSPITAL | Age: 21
LOS: 1 days | Discharge: STILL ADMITTED OR ADMINISTRATIVE | End: 2021-06-23
Payer: COMMERCIAL

## 2021-06-23 PROBLEM — F31.9 BIPOLAR DISORDER, UNSPECIFIED: Chronic | Status: ACTIVE | Noted: 2021-05-05

## 2021-06-23 PROCEDURE — 99214 OFFICE O/P EST MOD 30 MIN: CPT

## 2021-06-24 DIAGNOSIS — F12.20 CANNABIS DEPENDENCE, UNCOMPLICATED: ICD-10-CM

## 2021-06-24 DIAGNOSIS — F31.2 BIPOLAR DISORDER, CURRENT EPISODE MANIC SEVERE WITH PSYCHOTIC FEATURES: ICD-10-CM

## 2021-08-12 ENCOUNTER — EMERGENCY (EMERGENCY)
Facility: HOSPITAL | Age: 21
LOS: 1 days | Discharge: HOME | End: 2021-08-12
Admitting: EMERGENCY MEDICINE
Payer: COMMERCIAL

## 2021-08-12 ENCOUNTER — OUTPATIENT (OUTPATIENT)
Dept: OUTPATIENT SERVICES | Facility: HOSPITAL | Age: 21
LOS: 1 days | Discharge: STILL ADMITTED OR ADMINISTRATIVE | End: 2021-08-12

## 2021-08-12 VITALS
OXYGEN SATURATION: 99 % | DIASTOLIC BLOOD PRESSURE: 76 MMHG | HEART RATE: 89 BPM | SYSTOLIC BLOOD PRESSURE: 119 MMHG | RESPIRATION RATE: 14 BRPM | TEMPERATURE: 98 F | HEIGHT: 72 IN

## 2021-08-12 DIAGNOSIS — F12.10 CANNABIS ABUSE, UNCOMPLICATED: ICD-10-CM

## 2021-08-12 DIAGNOSIS — F29 UNSPECIFIED PSYCHOSIS NOT DUE TO A SUBSTANCE OR KNOWN PHYSIOLOGICAL CONDITION: ICD-10-CM

## 2021-08-12 PROCEDURE — 99284 EMERGENCY DEPT VISIT MOD MDM: CPT

## 2021-08-12 PROCEDURE — 90792 PSYCH DIAG EVAL W/MED SRVCS: CPT

## 2021-08-12 RX ORDER — RISPERIDONE 4 MG/1
1 TABLET ORAL
Qty: 7 | Refills: 0
Start: 2021-08-12 | End: 2021-08-18

## 2021-08-12 RX ORDER — DIVALPROEX SODIUM 500 MG/1
1 TABLET, DELAYED RELEASE ORAL
Qty: 7 | Refills: 0
Start: 2021-08-12 | End: 2021-08-18

## 2021-08-12 RX ORDER — BENZTROPINE MESYLATE 1 MG
1 TABLET ORAL
Qty: 14 | Refills: 0
Start: 2021-08-12 | End: 2021-08-18

## 2021-08-12 RX ORDER — BENZTROPINE MESYLATE 1 MG
1 TABLET ORAL
Qty: 7 | Refills: 0
Start: 2021-08-12 | End: 2021-08-18

## 2021-08-12 NOTE — ED PROVIDER NOTE - NSFOLLOWUPINSTRUCTIONS_ED_ALL_ED_FT
Rest, drink plenty of fluids.  Advance activity as tolerated.  Continue all previously prescribed medications as directed.  Follow up with your primary care physician in 48-72 hours- bring copies of your results.  Return to the ER for worsening or persistent symptoms, and/or ANY NEW OR CONCERNING SYMPTOMS. If you have issues obtaining follow up, please call: [phone] to obtain a doctor or specialist who takes your insurance in your area.  You may call [phone] to make an appointment with the internal medicine clinic.

## 2021-08-12 NOTE — ED BEHAVIORAL HEALTH ASSESSMENT NOTE - RISK ASSESSMENT
Low Acute Suicide Risk Acute risk factors include ongoing psychosis, CAH, substance use (MJ). Chronic risk factors include multiple prior psych hosp, hx poor adherence with appointments. Protective factors include supportive family, responsibility to family, denies SIIP/HIIP/NSSIIP, no hx violence per mother, no past SAs, help seeking, adherent with meds per pt and mother. Overall, the pt is at a lot acute risk for self-injurious or violent behavior, and does not require inpatient hosp at this time.

## 2021-08-12 NOTE — ED BEHAVIORAL HEALTH ASSESSMENT NOTE - CASE SUMMARY
20/M with hx of Bipolar Disorder with psychosis, multiple past psych hospitalization, first in 2014 at [facility] for psychosis, then [facility] 5/24-6/5/19 and again [facility] 4/2021-5/2021, has hx of poor compliance to meds and psych aftercare.  He was suppose to follow up with [facility] but has not connected with outpatient psychiatry as he has missed phone calls.  Pt has no hx of SA nor any self-injurious behavior.  Has hx of cannabis abuse.  Today, presented to ED upon referral from the [facility] due to psychosis and HI.    At this time, he presented with ongoing psychotic symptoms in the form of AH, paranoia/ grandiosity.  however, is not floridly psychotic nor is he psychotically agitated.   Since his  ED arrival, the Pt has been calm and cooperative.  There has been no occurrence of agitation/aggressive behavior.  No verbalization of active/ passive SI/HI.   There are no signs/symptoms of severe MDD/psychomotor retardation or any  acute nando.  Pt is not showing any signs/symptoms of intoxication or withdrawal.  He is not delirious.  Pt has not tested limits. Was able to maintain appropriate boundaries. Pt was easily redirected.  Overall, there has been no management issues.  Collateral information was obtained from his mother who reported that the Pt is not an acute danger to himself or others. Mother did not raise any safety concerns.  At this time, there is no justification to pursue involuntary psych admission for this Pt based on Pt's current clinical presentation as well collateral information obtained from his mother.  Pt was offered voluntary admission but he refused.  As such, will be discharged back to the community    RECOMMENDATIONS:     RECOMMENDATIONS:   1. Psychoeducation provided.  Encouraged to be compliant with the following meds: Risperdal 4mg HS, VPA 500mg daily and 1500mg HS and cogentin 2mg HS.  Also discussed role of psychotherapy.  Was also educated on impact of continued marijuana abuse on his health and well being as well as the importance of sobriety.      2. Emergency protocol reviewed.  Pt and mother were adviced to call 911 or come to the nearest ED should symptoms worsen; have increasing bouts of agitation/aggressive behavior; having SI/HI; or call [phone]    3. Pt has follow up appt 8/18/2021 with psychiatrist at [facility], and mother plans to bring pt in person to the appt to ensure that pt attends.   4. Sent 7 day refill for pt's meds as confirmed with mother: risperidone 4mg HS, cogentin 2mg HS and VPA ER 500mg HS instead of VPA 500mg daily and 1500mg HS (as he refused to take the later prescribed dosages).    5. Pt is also due for Pfizer 2nd dose 8/20/2020. Received first dose before going to [place]. 20/M with hx of Bipolar Disorder with psychosis, multiple past psych hospitalization, first in 2014 at [facility] for psychosis, then [facility] 5/24-6/5/19 and again [facility] 4/2021-5/2021, has hx of poor compliance to meds and psych aftercare.  He was suppose to follow up with [facility] but has not connected with outpatient psychiatry as he has missed phone calls.  Pt has no hx of SA nor any self-injurious behavior.  Has hx of cannabis abuse.  Today, presented to ED upon referral from the [facility] due to psychosis and HI.    At this time, he presented with ongoing psychotic symptoms in the form of AH, paranoia/ grandiosity.  however, is not floridly psychotic nor is he psychotically agitated.   Since his  ED arrival, the Pt has been calm and cooperative.  There has been no occurrence of agitation/aggressive behavior.  No verbalization of active/ passive SI/HI.   There are no signs/symptoms of severe MDD/psychomotor retardation or any  acute nando.  Pt is not showing any signs/symptoms of intoxication or withdrawal.  He is not delirious.  Pt has not tested limits. Was able to maintain appropriate boundaries. Pt was easily redirected.  Overall, there has been no management issues.  Collateral information was obtained from his mother who reported that the Pt is not an acute danger to himself or others. Mother did not raise any safety concerns.  At this time, there is no justification to pursue involuntary psych admission for this Pt based on Pt's current clinical presentation as well collateral information obtained from his mother.  Pt was offered voluntary admission but he refused.  As such, will be discharged back to the community    RECOMMENDATIONS:   1. Psychoeducation provided.  Encouraged to be compliant with the following meds: Risperdal 4mg HS, VPA 500mg daily and 1500mg HS and cogentin 2mg HS.  Also discussed role of psychotherapy.  Was also educated on impact of continued marijuana abuse on his health and well being as well as the importance of sobriety.      2. Emergency protocol reviewed.  Pt and mother were adviced to call 911 or come to the nearest ED should symptoms worsen; have increasing bouts of agitation/aggressive behavior; having SI/HI; or [phone]    3. Pt has follow up appt 8/18/2021 with psychiatrist at [facility], and mother plans to bring pt in person to the appt to ensure that pt attends.   4. Sent 7 day refill for pt's meds as confirmed with mother: risperidone 4mg HS, cogentin 2mg HS and VPA ER 500mg HS instead of VPA 500mg daily and 1500mg HS (as he refused to take the later prescribed dosages).    5. Pt is also due for Pfizer 2nd dose 8/20/2020. Received first dose before going to [place].

## 2021-08-12 NOTE — ED ADULT NURSE NOTE - CHIEF COMPLAINT QUOTE
sent in from crisis center for increased paranoia, hearing voices, and being verbally aggressive with family. hx bipolar disorder with psychotic features. compliant with meds as per mother. as per mom, pt smoked unk substance about a week ago and symptoms started. pt denies smoking anything. denies drugs/alcohol today. calm and cooperative at this time.

## 2021-08-12 NOTE — ED BEHAVIORAL HEALTH ASSESSMENT NOTE - MODIFICATIONS
I have seen and examined the Pt with Dr KRISTI Braun and performed key elements of the History and Mental Status Examination.  I concur with his assessment and recommendations.  I have discussed the Pt's assessment and plan of care with Dr KRISTI Braun.   I agree with the note as stated above, having amended the EMR as needed to reflect my findings.  This includes during the time I functioned as the attending physician for this Pt at the -ED of [facility].

## 2021-08-12 NOTE — ED PROVIDER NOTE - CLINICAL SUMMARY MEDICAL DECISION MAKING FREE TEXT BOX
20 y.o male with a PMhx of bipolar disorder with psychosis, history of cannabis abuse, Pt was sent in from the clinic for increase agitations. agitation-patient currently not agitated, psych consult.

## 2021-08-12 NOTE — ED PROVIDER NOTE - OBJECTIVE STATEMENT
20 y.o male with a PMhx of bipolar disorder with psychosis, history of cannabis abuse, Pt was sent in from the clinic for increase agitations, pt has been having increased anger, he has been hearing voices as well which were telling him to hurt his mother. Pt denies having any SI/HI. Pt has been agitated more lately. patient has been eating and drinking however hasn't been consistent with his medications. Patient denies having any significant drug or alcohol usage. Pt denies having any recent drug or alcohol usage, denies having any CP, SOB, SHI, headaches, dizziness, fever, chills, bodyaches.

## 2021-08-12 NOTE — ED BEHAVIORAL HEALTH ASSESSMENT NOTE - OTHER
paranoia per mother "calm" [facility], ISTOP Reference #: [id number] Klonopin 0.5mg 14 pills 7 day supply filled 5/3/2021, no other listings.

## 2021-08-12 NOTE — ED BEHAVIORAL HEALTH ASSESSMENT NOTE - HPI (INCLUDE ILLNESS QUALITY, SEVERITY, DURATION, TIMING, CONTEXT, MODIFYING FACTORS, ASSOCIATED SIGNS AND SYMPTOMS)
ISTOP Reference #: [id number] Klonopin 0.5mg 14 pills 7 day supply filled 5/3/2021, no other listings. ISTOP Reference #: [id number] Klonopin 0.5mg 14 pills 7 day supply filled 5/3/2021, no other listings.    19 y/o -American male, single, noncaregiver, lives with mother, stepfather, step-sister, unemployed, charted diagnoses of Bipolar Disorder with psychosis, history of cannabis abuse, multiple past psych hospitalization, first in  at [facility] for psychosis, then [facility] -19 and again [facility] 2021-2021, was to follow up with [facility] but has not connected with outpatient psychiatry as pt has missed phone calls, no history of self-injurious behavior or suicide attempts, no h/o violence or legal issues, no PMH, +regular cannabis use, [facility] with mother with hope to get ROSE, referred to ED for psychosis.    On interview, pt endorsed hearing voices that have been getting angrier. Among other things, pt said that the voices told him to hurt his mother, though he adamantly denied wanting to hurt his mother. Denied SI/NSSI/HI. Said that he is trying to be immortal, instructed writer to go to [email or web address] to learn how to be immortal, said he was one of the first people who found out about this. Endorsed 8 hours of sleep consistently over the past week. "Calm" mood. Denied racing thoughts. Future oriented, wants to get a job at [employer].     Smokes 1-3 MJ blunts per day. Drank 3 beers , drinks roughly this much every 2-3 weeks. Denied other drug/tobacco use.     Denied hx trauma.    Said that he thinks he has a therapist but misses appts.    Endorsed AH for roughly the past year.    When asked if there was a period of consistently elevated or irritable mood with increased energy lasting several days in a row, pt was not sure and unable to answer.     Said that he received the first dose of the Pfizer vaccine, but not the second.    Per mother, Amairani Cherry: On  pt went out for friends birthday, they smoke and drank. Since then pt has been thinking people are out ot hurt him. 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 Mother at first thought this was not real but now is not sure. Mother's nephew told mother that someone phoned a bomb threat to bomb pt's house, which confirmed what pt told mother.     Pt and mother came back from [place] yesterday. Pt said that he thinks that a little girl wants to kill mother. Pt was yelling "get rid of her!". Pt came in for an ROSE, told the doctor he heard voices, then agreed to come to [facility].     Pt was at [facility] in May, pt was to follow up at [facility], was supposed to speak with psychiatrist  or , missed psychiatrist's call, re-scheduled for . Pt ran out of meds [facility].     Pt never had ROSE before. Mother convinced pt to get the injection, went to Crisis clinic, then came to ED.    Pt had knife in his room because he felt someone was threatening him. Pt did this before his prior hosp. Pt thinks that people are out to hurt him at night. Mother changed the lock so pt can lock himself in his room to feel safe, has not had a knife in his room since then. Mother took pt to [place] to be with his father but it did not work as pt was getting sick, so came back to [place].     Pt has not made statements about NSSI/SI/HI. Yells at people to shut up. Pt took step-sister's hand in the car and told her to shut her mouth because she ate a sandwich that did not belong to her, belonged to mother. Pt was being protective of mother. Mother not concerned that pt would hurt step-sister. Mother is not concerned for pt's safety or other people's safety as a result of pt's symptoms. Pt indicated that he is hopeful to meet a girl in the hospital to hook up.     Pt thinks Miracle, his step-sister is trying to mother and killed maternal grandmother (she  of natural causes 3 years ago).    Pt has been sleeping this past week, 8 hours per night. Asleep 5-6PM awake at 1AM/. Ate a lot of pizza recently. Speech has not been increased in pace. Pt has been spending more money on food and someone to be his bodyguard, gave the bodyguard $250 twice. Pt wants to be a rapper.     Mother wants pt to continue with [facility] rather than [facility].    Medications:  -risperidone 4mg QHS  -Depakote 500mg QHS (was supposed to take 500mg QD and 1500mg QHS)  -benztropine 2mg QHS    Pt is due for Pfizer 2nd dose 2020. Received first dose before going to [place]. 21 y/o -American male, single, noncaregiver, lives with mother, stepfather, step-sister, unemployed, charted diagnoses of Bipolar Disorder with psychosis, history of cannabis abuse, multiple past psych hospitalization, first in  at [facility] for psychosis, then [facility] -19 and again [facility] 2021-2021, [facility] but has not connected with outpatient psychiatry as pt has missed phone calls, no history of self-injurious behavior or suicide attempts, no h/o violence or legal issues, no PMH, +regular cannabis use, [facility] to [facility] with mother with hope to get ROSE, referred to ED for psychosis.    On interview, pt endorsed hearing voices that have been getting angrier. Among other things, pt said that the voices told him to hurt his mother, though he adamantly denied wanting to hurt his mother. Denied SI/NSSI/HI. Said that he is trying to be immortal, instructed writer to go to [email or web address] to learn how to be immortal, said he was one of the first people who found out about this. Endorsed 8 hours of sleep consistently over the past week. "Calm" mood. Denied racing thoughts. He is Future oriented, claims he wants to get a job at [employer]. admits to Smoking 1-3 MJ blunts per day. Drank 3 beers , drinks roughly this much every 2-3 weeks. Denied other drug/tobacco use.  Said that he thinks he has a therapist but misses appts.    Endorsed AH for roughly the past year.  When asked if there was a period of consistently elevated or irritable mood with increased energy lasting several days in a row, pt was not sure and unable to answer.   In addition, Pt had knife in his room because he felt someone was threatening him. Pt did this before his prior hosp. Pt thinks that people are out to hurt him at night. Mother changed the lock so pt can lock himself in his room to feel safe, has not had a knife in his room since then. Mother took pt to [place] to be with his father but it did not work as pt was getting sick, so came back to [place].     Pt has not made statements about NSSI/SI/HI. Yells at people to shut up. Pt took step-sister's hand in the car and told her to shut her mouth because she ate a sandwich that did not belong to her, belonged to mother. Pt was being protective of mother. Mother not concerned that pt would hurt step-sister. Mother is not concerned for pt's safety or other people's safety as a result of pt's symptoms. Pt indicated that he is hopeful to meet a girl in the hospital to hook up.   Pt thinks Miracle, his step-sister is trying to mother and killed maternal grandmother (she  of natural causes 3 years ago).    Pt has been sleeping this past week, 8 hours per night. Asleep 5-6PM awake at 1AM/. Ate a lot of pizza recently. Speech has not been increased in pace. Pt has been spending more money on food and someone to be his bodyguard, gave the bodyguard $250 twice. Pt wants to be a rapper.     Per mother, Amairani Cherry: On  pt went out for friends birthday, they smoke and drank. Since then pt has been thinking people are out ot hurt him. 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 Mother at first thought this was not real but now is not sure. Mother's nephew told mother that someone phoned a bomb threat to bomb pt's house, which confirmed what pt told mother.     Pt and mother came back from [place] yesterday. Pt said that he thinks that a little girl wants to kill mother. Pt was yelling "get rid of her!". Pt came in for an ROSE, told the doctor he heard voices, then agreed to come to [facility].     Pt was at [facility] in May, pt was to follow up at [facility], was supposed to speak with psychiatrist  or , missed psychiatrist's call, re-scheduled for . Pt ran out of meds [facility].   Pt never had ROSE before. Mother convinced pt to get the injection, went to Crisis clinic, then came to ED.  Mother wants pt to continue with [facility] rather than [facility].  Mother did not raise any safety concerns.  does not feel that Pt needs to be psychiatrically admitted.

## 2021-08-12 NOTE — ED BEHAVIORAL HEALTH ASSESSMENT NOTE - NSSUICPROTFACT_PSY_ALL_CORE
Responsibility to children, family, or others/Identifies reasons for living/Supportive social network of family or friends/Other

## 2021-08-12 NOTE — ED BEHAVIORAL HEALTH ASSESSMENT NOTE - OTHER PAST PSYCHIATRIC HISTORY (INCLUDE DETAILS REGARDING ONSET, COURSE OF ILLNESS, INPATIENT/OUTPATIENT TREATMENT)
admission [facility] 2014, 2019, 2021 April - May discharged on risperidone, Depakote and benztropine.

## 2021-08-12 NOTE — ED ADULT NURSE NOTE - TEMPLATE
"Per home health RN:  \"Right elbow wound 0.8 x 1.7 x 0.5 cm. There is firm yellow slough over wound with opening at about 9 o'clock position. Opening is .5 cm depth. Undermined 0.7 cm at 8 o'clock position. Red base can be seen. Periwound intact\"    Referral placed per  recommendation.    "
Psych/Behavioral

## 2021-08-12 NOTE — ED ADULT NURSE NOTE - OBJECTIVE STATEMENT
Pt arrived with EMS, activated by crisis center after becoming agitated. Pt admits to auditory hallucinations and persecutory hallucinations. States to marijuana use beginning of the month. Pt noted to have flight of ideas and tangental thoughts. Denies si and homicidal ideation . Pt states to be compliant with medications. Respirations even/unlabored, nad noted. belongings secured. mother took valuable. psych eval pending

## 2021-08-12 NOTE — ED PROVIDER NOTE - PATIENT PORTAL LINK FT
You can access the FollowMyHealth Patient Portal offered by [facility] by registering at the following website: [email or web address]. By joining [facility]’s FollowMyHealth portal, you will also be able to view your health information using other applications (apps) compatible with our system.

## 2021-08-12 NOTE — ED CLERICAL - NS ED CLERK NOTE PRE-ARRIVAL INFORMATION; ADDITIONAL PRE-ARRIVAL INFORMATION
Patient being sent in for psychosis with violent thoughts  pt has hx of bipolar with psychotic episodes.

## 2021-08-12 NOTE — ED BEHAVIORAL HEALTH ASSESSMENT NOTE - DESCRIPTION
Calm and cooperative, no PRNs or restraints.    Vital Signs Last 24 Hrs  T(C): 36.9 (12 Aug 2021 16:12), Max: 36.9 (12 Aug 2021 16:12)  T(F): 98.4 (12 Aug 2021 16:12), Max: 98.4 (12 Aug 2021 16:12)  HR: 89 (12 Aug 2021 16:12) (89 - 89)  BP: 119/76 (12 Aug 2021 16:12) (119/76 - 119/76)  BP(mean): --  RR: 14 (12 Aug 2021 16:12) (14 - 14)  SpO2: 99% (12 Aug 2021 16:12) (99% - 99%) Lives with mother, step-father, step-sister, maternal uncle and maternal uncle's girlfriend. Unemployed scoliosis, vision impairment per pt

## 2021-08-12 NOTE — ED ADULT NURSE REASSESSMENT NOTE - NS ED NURSE REASSESS COMMENT FT1
Pt received from awake, calm and cooperative. No distress noted. Belongings returned. Pt picked up by mother.

## 2021-08-12 NOTE — ED BEHAVIORAL HEALTH ASSESSMENT NOTE - CURRENT MEDICATION
-risperidone 4mg QHS  -Depakote 500mg QHS (was supposed to take 500mg QD and 1500mg QHS)  -benztropine 2mg QHS

## 2021-08-12 NOTE — ED BEHAVIORAL HEALTH ASSESSMENT NOTE - SUMMARY
21 y/o -American male, single, noncaregiver, lives with mother, stepfather, step-sister, unemployed, charted diagnoses of Bipolar Disorder with psychosis, history of cannabis abuse, multiple past psych hospitalization, first in 2014 at [facility] for psychosis, then [facility] 5/24-6/5/19 and again [facility] 4/2021-5/2021, was to follow up with [facility] but has not connected with outpatient psychiatry as pt has missed phone calls, no history of self-injurious behavior or suicide attempts, no h/o violence or legal issues, no PMH, +regular cannabis use, [facility] with hope to get ROSE, referred to ED for psychosis.    Pt presented with ongoing psychosis, denies SIIP/NSSIIP/HIIP, and per mother pt is not an acute danger to himself or others. Pt was offered voluntary admission, declined. Pt has follow up appt 8/18/2021 with psychiatrist at [facility], and mother plans to bring pt in person to the appt to ensure that pt attends. Pt does not require inpatient hosp at this time, though would benefit from close follow up outpatient, which he will receive. Sent 7 day refill for pt's meds, risperidone 4mg QHS, benztropine 0.5mg BID, Depakote ER 500mg QHS, mother expressed agreement and 21 y/o -American male, single, noncaregiver, lives with mother, stepfather, step-sister, unemployed, charted diagnoses of Bipolar Disorder with psychosis, history of cannabis abuse, multiple past psych hospitalization, first in 2014 at [facility] for psychosis, then [facility] 5/24-6/5/19 and again [facility] 4/2021-5/2021, was to follow up with [facility] but has not connected with outpatient psychiatry as pt has missed phone calls, no history of self-injurious behavior or suicide attempts, no h/o violence or legal issues, no PMH, +regular cannabis use, presented [facility] with mother with hope to get ROSE, referred to ED for psychosis.    Pt presented with ongoing psychosis, denies SIIP/NSSIIP/HIIP, and per mother pt is not an acute danger to himself or others. Pt was offered voluntary admission, declined. Pt has follow up appt 8/18/2021 with psychiatrist at [facility], and mother plans to bring pt in person to the appt to ensure that pt attends. Pt does not require inpatient hosp at this time, though would benefit from close follow up outpatient, which he will receive. Sent 7 day refill for pt's meds as confirmed with mother, risperidone 4mg QHS, benztropine 2mg QHS, Depakote ER 500mg QHS, mother expressed agreement and understanding. 19 y/o -American male, single, noncaregiver, lives with mother, stepfather, step-sister, unemployed, charted diagnoses of Bipolar Disorder with psychosis, history of cannabis abuse, multiple past psych hospitalization, first in 2014 at [facility] for psychosis, then [facility] 5/24-6/5/19 and again [facility] 4/2021-5/2021, was to follow up with [facility] but has not connected with outpatient psychiatry as pt has missed phone calls, no history of self-injurious behavior or suicide attempts, no h/o violence or legal issues, no PMH, +regular cannabis use, [facility] with [facility] with hope to get ROSE, referred to ED for psychosis.    Pt presented with psychotic symptoms (AH, delusional thoughts).  denies SIIP/NSSIIP/HIIP, and per mother pt is not an acute danger to himself or others. Pt was offered voluntary admission, declined. Pt has follow up appt 8/18/2021 with psychiatrist at [facility], and mother plans to bring pt in person to the appt to ensure that pt attends. Pt does not require inpatient hosp at this time, though would benefit from close follow up outpatient, which he will receive. Sent 7 day refill for pt's meds as confirmed with mother, risperidone 4mg QHS, benztropine 2mg QHS, Depakote ER 500mg QHS, mother expressed agreement and understanding.  Pt is also due for Pfizer 2nd dose 8/20/2020. Received first dose before going to [place].

## 2021-08-13 DIAGNOSIS — F31.9 BIPOLAR DISORDER, UNSPECIFIED: ICD-10-CM

## 2021-08-13 DIAGNOSIS — F12.20 CANNABIS DEPENDENCE, UNCOMPLICATED: ICD-10-CM

## 2021-10-13 NOTE — BH INPATIENT PSYCHIATRY ASSESSMENT NOTE - TELEPSYCHIATRY?
[address] [facility] and spoke with Veronica Herrera. She verified they received the referral for PEG/Port. They will call patient to schedule.
No

## 2021-12-01 PROCEDURE — G9005: CPT

## 2022-01-09 NOTE — BH TREATMENT PLAN - NSTXPROBDCOPLK_PSY_ALL_CORE
DISCHARGE ISSUE - LACK OF APPROPRIATE OUTPATIENT SERVICES
normal...

## 2022-01-31 NOTE — BH INPATIENT PSYCHIATRY ASSESSMENT NOTE - NSTXDCOPLKGOAL_PSY_ALL_CORE
CAPD fill of 2.5L of icodextrin completed without complication. Dressing was changed and site was dry, clean and intact. Endorsed primary RN.    Will agree to consider an appropriate level of outpatient care

## 2022-07-09 NOTE — ED ADULT TRIAGE NOTE - CHIEF COMPLAINT QUOTE
After evaluating the patient it has been determined they are at risk for postpartum hemorrhage. sent in from crisis center for increased paranoia, hearing voices, and being verbally aggressive with family. hx bipolar disorder with psychotic features. compliant with meds as per mother. as per mom, pt smoked unk substance about a week ago and symptoms started. pt denies smoking anything. denies drugs/alcohol today. calm and cooperative at this time.

## 2022-09-03 NOTE — ED ADULT NURSE NOTE - EXTENSIONS OF SELF_ADULT
Low back pain x 3 weeks since picking up leaves  Took steroids and on 2nd round of anti inflammatories  Ambulatory in triage  Denies incontinence  States pain is radiating down left side now  
None

## 2022-11-12 NOTE — BH INPATIENT PSYCHIATRY PROGRESS NOTE - NSBHFUPMEDSE_PSY_A_CORE
Assisted pt to bathroom via  wheelchair and back to recliner  Pt notice blood in urine    
Bladder scan =10 ml urine  
Post Void Residual - 10mL   
Pt bladder scanned; no significant urine of note in bladder.  
None known

## 2023-02-16 NOTE — ED ADULT NURSE NOTE - NS_BH TRG QUESTION5_ED_ALL_ED
Past 24 hrs Cyclosporine Counseling:  I discussed with the patient the risks of cyclosporine including but not limited to hypertension, gingival hyperplasia,myelosuppression, immunosuppression, liver damage, kidney damage, neurotoxicity, lymphoma, and serious infections. The patient understands that monitoring is required including baseline blood pressure, CBC, CMP, lipid panel and uric acid, and then 1-2 times monthly CMP and blood pressure.

## 2023-08-15 NOTE — ED ADULT NURSE NOTE - CCCP TRG CHIEF CMPLNT
psychiatric evaluation
Failureto dilate  Failure to descend  Inadequate uterine contraction
FAMILY HISTORY:  Family history of cerebrovascular accident (CVA) in mother  Family history of prostate cancer in father

## 2024-01-11 NOTE — BH INPATIENT PSYCHIATRY PROGRESS NOTE - NSBHMSEJUDGE_PSY_A_CORE
[de-identified] : 1/11/2024, NSR, non-specific ST changes 12/22/2022, NSR, non-specific ST changes
[de-identified] : 08/17/2020, Pharmacologic Nuclear Stress Testing: no ischemia or evidence of prior infarction noted on SPECT images.
[de-identified] : 08/17/2020, Trace MR, mild TR, normal estimated PASP, mild LVH LVEF 65%. \par  
[de-identified] : 2016, normal coronary arteries.
Poor
Fair
Poor

## 2024-05-08 NOTE — ED ADULT NURSE NOTE - NSPATIENTFLAG_GEN_A_ER
LMOM to return call to the office. Provided pt office phone [phone] along with office hours.     Green (Altered Mental Status/Behavior)

## 2024-06-12 NOTE — ED PROVIDER NOTE - NSCAREINITIATED _GEN_ER
Left message for patient to return call to rescheduled cancelled appointment in June with Dr. Parsons due to provider being out of the office for several weeks on paternity leave. Please assist.     Letter sent   
Writer NAKUL for pt to return call and reschedule as PCP is OOO  
Yassine Agarwal(NP)

## 2025-07-09 NOTE — BH INPATIENT PSYCHIATRY PROGRESS NOTE - NSBHMSEATTEN_PSY_A_CORE
Impaired
Ambulatory
Impaired